# Patient Record
Sex: FEMALE | Race: BLACK OR AFRICAN AMERICAN | Employment: OTHER | URBAN - METROPOLITAN AREA
[De-identification: names, ages, dates, MRNs, and addresses within clinical notes are randomized per-mention and may not be internally consistent; named-entity substitution may affect disease eponyms.]

---

## 2022-01-25 ENCOUNTER — HOSPITAL ENCOUNTER (OUTPATIENT)
Dept: LAB | Age: 67
Discharge: HOME | End: 2022-01-25
Payer: MEDICARE

## 2022-01-25 ENCOUNTER — TELEPHONE (OUTPATIENT)
Dept: FAMILY MEDICINE CLINIC | Age: 67
End: 2022-01-25

## 2022-01-25 ENCOUNTER — OFFICE VISIT (OUTPATIENT)
Dept: FAMILY MEDICINE CLINIC | Age: 67
End: 2022-01-25
Payer: MEDICARE

## 2022-01-25 VITALS
TEMPERATURE: 98.7 F | HEIGHT: 72 IN | WEIGHT: 293 LBS | SYSTOLIC BLOOD PRESSURE: 150 MMHG | HEART RATE: 61 BPM | DIASTOLIC BLOOD PRESSURE: 72 MMHG | OXYGEN SATURATION: 96 % | BODY MASS INDEX: 39.68 KG/M2 | RESPIRATION RATE: 17 BRPM

## 2022-01-25 DIAGNOSIS — Z13.0 SCREENING FOR ENDOCRINE, METABOLIC AND IMMUNITY DISORDER: ICD-10-CM

## 2022-01-25 DIAGNOSIS — Z13.228 SCREENING FOR ENDOCRINE, METABOLIC AND IMMUNITY DISORDER: ICD-10-CM

## 2022-01-25 DIAGNOSIS — Z13.31 DEPRESSION SCREENING: ICD-10-CM

## 2022-01-25 DIAGNOSIS — Z71.89 ACP (ADVANCE CARE PLANNING): ICD-10-CM

## 2022-01-25 DIAGNOSIS — Z13.220 ENCOUNTER FOR LIPID SCREENING FOR CARDIOVASCULAR DISEASE: ICD-10-CM

## 2022-01-25 DIAGNOSIS — Z00.00 MEDICARE ANNUAL WELLNESS VISIT, INITIAL: ICD-10-CM

## 2022-01-25 DIAGNOSIS — E11.21 TYPE 2 DIABETES MELLITUS WITH DIABETIC NEPHROPATHY, WITHOUT LONG-TERM CURRENT USE OF INSULIN (HCC): ICD-10-CM

## 2022-01-25 DIAGNOSIS — I10 PRIMARY HYPERTENSION: ICD-10-CM

## 2022-01-25 DIAGNOSIS — Z13.6 ENCOUNTER FOR LIPID SCREENING FOR CARDIOVASCULAR DISEASE: ICD-10-CM

## 2022-01-25 DIAGNOSIS — N18.6 ESRD (END STAGE RENAL DISEASE) (HCC): ICD-10-CM

## 2022-01-25 DIAGNOSIS — Z13.29 SCREENING FOR ENDOCRINE, METABOLIC AND IMMUNITY DISORDER: ICD-10-CM

## 2022-01-25 DIAGNOSIS — Z13.820 ENCOUNTER FOR OSTEOPOROSIS SCREENING IN ASYMPTOMATIC POSTMENOPAUSAL PATIENT: ICD-10-CM

## 2022-01-25 DIAGNOSIS — Z78.0 ENCOUNTER FOR OSTEOPOROSIS SCREENING IN ASYMPTOMATIC POSTMENOPAUSAL PATIENT: ICD-10-CM

## 2022-01-25 DIAGNOSIS — R06.09 EXERTIONAL DYSPNEA: ICD-10-CM

## 2022-01-25 DIAGNOSIS — Z12.31 ENCOUNTER FOR SCREENING MAMMOGRAM FOR MALIGNANT NEOPLASM OF BREAST: ICD-10-CM

## 2022-01-25 LAB
ALBUMIN SERPL-MCNC: 3.7 G/DL (ref 3.4–5)
ALBUMIN/GLOB SERPL: 1 {RATIO} (ref 0.8–1.7)
ALP SERPL-CCNC: 144 U/L (ref 45–117)
ALT SERPL-CCNC: 28 U/L (ref 13–56)
ANION GAP SERPL CALC-SCNC: 4 MMOL/L (ref 3–18)
AST SERPL-CCNC: 11 U/L (ref 10–38)
BASOPHILS # BLD: 0.1 K/UL (ref 0–0.1)
BASOPHILS NFR BLD: 1 % (ref 0–2)
BILIRUB SERPL-MCNC: 0.4 MG/DL (ref 0.2–1)
BUN SERPL-MCNC: 53 MG/DL (ref 7–18)
BUN/CREAT SERPL: 14 (ref 12–20)
CALCIUM SERPL-MCNC: 9.7 MG/DL (ref 8.5–10.1)
CHLORIDE SERPL-SCNC: 113 MMOL/L (ref 100–111)
CHOLEST SERPL-MCNC: 64 MG/DL
CO2 SERPL-SCNC: 26 MMOL/L (ref 21–32)
CREAT SERPL-MCNC: 3.73 MG/DL (ref 0.6–1.3)
DIFFERENTIAL METHOD BLD: ABNORMAL
EOSINOPHIL # BLD: 0.4 K/UL (ref 0–0.4)
EOSINOPHIL NFR BLD: 7 % (ref 0–5)
ERYTHROCYTE [DISTWIDTH] IN BLOOD BY AUTOMATED COUNT: 15.9 % (ref 11.6–14.5)
EST. AVERAGE GLUCOSE BLD GHB EST-MCNC: 137 MG/DL
GLOBULIN SER CALC-MCNC: 3.7 G/DL (ref 2–4)
GLUCOSE SERPL-MCNC: 37 MG/DL (ref 74–99)
HBA1C MFR BLD: 6.4 % (ref 4.2–5.6)
HCT VFR BLD AUTO: 33.3 % (ref 35–45)
HDLC SERPL-MCNC: 55 MG/DL (ref 40–60)
HDLC SERPL: 1.2 {RATIO} (ref 0–5)
HGB BLD-MCNC: 9.4 G/DL (ref 12–16)
IMM GRANULOCYTES # BLD AUTO: 0.1 K/UL (ref 0–0.04)
IMM GRANULOCYTES NFR BLD AUTO: 1 % (ref 0–0.5)
LDLC SERPL CALC-MCNC: NORMAL MG/DL (ref 0–100)
LIPID PROFILE,FLP: NORMAL
LYMPHOCYTES # BLD: 1.8 K/UL (ref 0.9–3.6)
LYMPHOCYTES NFR BLD: 29 % (ref 21–52)
MCH RBC QN AUTO: 25.5 PG (ref 24–34)
MCHC RBC AUTO-ENTMCNC: 28.2 G/DL (ref 31–37)
MCV RBC AUTO: 90.2 FL (ref 78–100)
MONOCYTES # BLD: 0.5 K/UL (ref 0.05–1.2)
MONOCYTES NFR BLD: 8 % (ref 3–10)
NEUTS SEG # BLD: 3.2 K/UL (ref 1.8–8)
NEUTS SEG NFR BLD: 54 % (ref 40–73)
NRBC # BLD: 0 K/UL (ref 0–0.01)
NRBC BLD-RTO: 0 PER 100 WBC
PLATELET # BLD AUTO: 298 K/UL (ref 135–420)
PLATELET COMMENTS,PCOM: ABNORMAL
PMV BLD AUTO: 10.9 FL (ref 9.2–11.8)
POTASSIUM SERPL-SCNC: 5.3 MMOL/L (ref 3.5–5.5)
PROT SERPL-MCNC: 7.4 G/DL (ref 6.4–8.2)
RBC # BLD AUTO: 3.69 M/UL (ref 4.2–5.3)
RBC MORPH BLD: ABNORMAL
SODIUM SERPL-SCNC: 143 MMOL/L (ref 136–145)
TRIGL SERPL-MCNC: 49 MG/DL (ref ?–150)
TSH SERPL DL<=0.05 MIU/L-ACNC: 3.84 UIU/ML (ref 0.36–3.74)
VLDLC SERPL CALC-MCNC: NORMAL MG/DL
WBC # BLD AUTO: 6.1 K/UL (ref 4.6–13.2)

## 2022-01-25 PROCEDURE — 85025 COMPLETE CBC W/AUTO DIFF WBC: CPT

## 2022-01-25 PROCEDURE — 83036 HEMOGLOBIN GLYCOSYLATED A1C: CPT

## 2022-01-25 PROCEDURE — 80061 LIPID PANEL: CPT

## 2022-01-25 PROCEDURE — 99204 OFFICE O/P NEW MOD 45 MIN: CPT | Performed by: FAMILY MEDICINE

## 2022-01-25 PROCEDURE — 36415 COLL VENOUS BLD VENIPUNCTURE: CPT

## 2022-01-25 PROCEDURE — 80053 COMPREHEN METABOLIC PANEL: CPT

## 2022-01-25 PROCEDURE — 84443 ASSAY THYROID STIM HORMONE: CPT

## 2022-01-25 PROCEDURE — G0438 PPPS, INITIAL VISIT: HCPCS | Performed by: FAMILY MEDICINE

## 2022-01-25 RX ORDER — ASPIRIN 81 MG/1
TABLET ORAL DAILY
COMMUNITY

## 2022-01-25 RX ORDER — CARVEDILOL 6.25 MG/1
6.25 TABLET ORAL 2 TIMES DAILY WITH MEALS
COMMUNITY
End: 2022-03-01 | Stop reason: SDUPTHER

## 2022-01-25 RX ORDER — FOLIC ACID 1 MG/1
5 TABLET ORAL DAILY
COMMUNITY
End: 2022-03-01 | Stop reason: SDUPTHER

## 2022-01-25 RX ORDER — SIMVASTATIN 40 MG/1
40 TABLET, FILM COATED ORAL
COMMUNITY
End: 2022-03-01 | Stop reason: SDUPTHER

## 2022-01-25 RX ORDER — AMLODIPINE BESYLATE 10 MG/1
10 TABLET ORAL DAILY
COMMUNITY
End: 2022-03-01 | Stop reason: SDUPTHER

## 2022-01-25 RX ORDER — FOLIC ACID 1 MG/1
TABLET ORAL DAILY
COMMUNITY
End: 2022-02-01

## 2022-01-25 RX ORDER — CALCIUM CARBONATE 200(500)MG
2 TABLET,CHEWABLE ORAL 2 TIMES DAILY
Qty: 120 TABLET | Refills: 0 | Status: SHIPPED | OUTPATIENT
Start: 2022-01-25 | End: 2022-02-24

## 2022-01-25 RX ORDER — SEVELAMER CARBONATE 800 MG/1
800 TABLET, FILM COATED ORAL 3 TIMES DAILY
COMMUNITY
End: 2022-03-01 | Stop reason: SDUPTHER

## 2022-01-25 RX ORDER — FUROSEMIDE 40 MG/1
40 TABLET ORAL DAILY
COMMUNITY
End: 2022-02-01

## 2022-01-25 RX ORDER — FUROSEMIDE 40 MG/1
40 TABLET ORAL DAILY
COMMUNITY
End: 2022-03-01

## 2022-01-25 RX ORDER — METHYLDOPA 250 MG/1
250 TABLET, FILM COATED ORAL 3 TIMES DAILY
COMMUNITY
End: 2022-03-01 | Stop reason: SDUPTHER

## 2022-01-25 RX ORDER — HYDRALAZINE HYDROCHLORIDE 25 MG/1
25 TABLET, FILM COATED ORAL 3 TIMES DAILY
COMMUNITY
End: 2022-03-08

## 2022-01-25 RX ORDER — OMEPRAZOLE 40 MG/1
40 CAPSULE, DELAYED RELEASE ORAL DAILY
COMMUNITY
End: 2022-03-01 | Stop reason: SDUPTHER

## 2022-01-25 RX ORDER — CALCITRIOL 0.25 UG/1
0.25 CAPSULE ORAL DAILY
COMMUNITY
End: 2022-03-01 | Stop reason: SDUPTHER

## 2022-01-25 RX ORDER — INSULIN LISPRO 100 [IU]/ML
INJECTION, SOLUTION INTRAVENOUS; SUBCUTANEOUS
Qty: 1 EACH | Refills: 2
Start: 2022-01-25 | End: 2022-03-01 | Stop reason: SDUPTHER

## 2022-01-25 RX ORDER — CALCIUM CARBONATE 200(500)MG
1 TABLET,CHEWABLE ORAL 2 TIMES DAILY
COMMUNITY
End: 2022-01-25 | Stop reason: SDUPTHER

## 2022-01-25 NOTE — PATIENT INSTRUCTIONS
Medicare Wellness Visit, Female     The best way to live healthy is to have a lifestyle where you eat a well-balanced diet, exercise regularly, limit alcohol use, and quit all forms of tobacco/nicotine, if applicable. Regular preventive services are another way to keep healthy. Preventive services (vaccines, screening tests, monitoring & exams) can help personalize your care plan, which helps you manage your own care. Screening tests can find health problems at the earliest stages, when they are easiest to treat. [facility] follows the current, evidence-based guidelines published by the [facility] ([facility]) when recommending preventive services for our patients. Because we follow these guidelines, sometimes recommendations change over time as research supports it. (For example, mammograms used to be recommended annually. Even though Medicare will still pay for an annual mammogram, the newer guidelines recommend a mammogram every two years for women of average risk). Of course, you and your doctor may decide to screen more often for some diseases, based on your risk and your co-morbidities (chronic disease you are already diagnosed with). Preventive services for you include:  - Medicare offers their members a free annual wellness visit, which is time for you and your primary care provider to discuss and plan for your preventive service needs. Take advantage of this benefit every year!  -All adults over the age of 72 should receive the recommended pneumonia vaccines. Current USPSTF guidelines recommend a series of two vaccines for the best pneumonia protection.   -All adults should have a flu vaccine yearly and a tetanus vaccine every 10 years.   -All adults age 48 and older should receive the shingles vaccines (series of two vaccines).       -All adults age 38-68 who are overweight should have a diabetes screening test once every three years.   -All adults born between [birth year] and 1964 should be screened once for Hepatitis C.  -Other screening tests and preventive services for persons with diabetes include: an eye exam to screen for diabetic retinopathy, a kidney function test, a foot exam, and stricter control over your cholesterol.   -Cardiovascular screening for adults with routine risk involves an electrocardiogram (ECG) at intervals determined by your doctor.   -Colorectal cancer screenings should be done for adults age 54-65 with no increased risk factors for colorectal cancer. There are a number of acceptable methods of screening for this type of cancer. Each test has its own benefits and drawbacks. Discuss with your doctor what is most appropriate for you during your annual wellness visit. The different tests include: colonoscopy (considered the best screening method), a fecal occult blood test, a fecal DNA test, and sigmoidoscopy.    -A bone mass density test is recommended when a woman turns 65 to screen for osteoporosis. This test is only recommended one time, as a screening. Some providers will use this same test as a disease monitoring tool if you already have osteoporosis. -Breast cancer screenings are recommended every other year for women of normal risk, age 54-69.  -Cervical cancer screenings for women over age 72 are only recommended with certain risk factors.      Here is a list of your current Health Maintenance items (your personalized list of preventive services) with a due date:  Health Maintenance Due   Topic Date Due    Hepatitis C Test  Never done    COVID-19 Vaccine (1) Never done    DTaP/Tdap/Td  (1 - Tdap) Never done    Cervical cancer screen  Never done    Cholesterol Test   Never done    Colorectal Screening  Never done    Shingles Vaccine (1 of 2) Never done    Mammogram  Never done    Bone Mineral Density   Never done    Pneumococcal Vaccine (1 of 1 - PPSV23) Never done    Yearly Flu Vaccine (1) Never done Learning About Fluid Overload  What is fluid overload? Fluid overload means that your body has too much water. The extra fluid in your body can raise your blood pressure and force your heart to work harder. It can also make it hard for you to breathe. Most of your body is made up of water. The body uses minerals like sodium and potassium to help organs such as your heart, kidneys, and liver balance how much water you need. For example, the heart pumps blood to move water around the body. And the kidneys work to get rid of the water that the body doesn't need. Health conditions like kidney disease, heart failure, and cirrhosis can cause fluid overload. Other things can cause extra fluid to build up. IV fluids, some medicines, too much salt (sodium) from food, and certain medical treatments can sometimes cause this fluid increase. What are the symptoms? Some of the most common symptoms are:  · Gaining weight over a short period of time. · Swelling in the ankles or legs. · Shortness of breath. How is it treated? The goal of treatment is to remove the extra fluid in your body. Your treatment will depend on the cause. Your doctor may:  · Give you medicines, such as diuretics (also called \"water pills\"). They help your body get rid of the extra fluid. · Recommend that you limit sodium. Follow-up care is a key part of your treatment and safety. Be sure to make and go to all appointments, and call your doctor if you are having problems. It's also a good idea to know your test results and keep a list of the medicines you take. Where can you learn more? Go to http://www.gray.com/  Enter O110 in the search box to learn more about \"Learning About Fluid Overload. \"  Current as of: April 29, 2021               Content Version: 13.0  © 3991-1964 Healthwise, [employer].    Care instructions adapted under license by [employer] (which disclaims liability or warranty for this information). If you have questions about a medical condition or this instruction, always ask your healthcare professional. [place] [address] any warranty or liability for your use of this information.

## 2022-01-25 NOTE — ACP (ADVANCE CARE PLANNING)
Advance Care Planning     Advance Care Planning (ACP) Physician/NP/PA Conversation      Date of Conversation: 1/25/2022  Conducted with: Patient with Decision Making Capacity    Healthcare Decision Maker:   No healthcare decision makers have been documented. Click here to complete [place] including selection of the Healthcare Decision Maker Relationship (ie \"Primary\")      Today we documented Decision Maker(s) consistent with Legal Next of Kin hierarchy. Care Preferences:    Hospitalization: \"If your health worsens and it becomes clear that your chance of recovery is unlikely, what would be your preference regarding hospitalization? \"  The patient would prefer hospitalization. Ventilation: \"If you were unable to breathe on your own and your chance of recovery was unlikely, what would be your preference about the use of a ventilator (breathing machine) if it was available to you? \"   The patient would desire the use of a ventilator. Resuscitation: \"In the event your heart stopped as a result of an underlying serious health condition, would you want attempts to be made to restart your heart, or would you prefer a natural death? \"   Yes, attempt to resuscitate.     Additional topics discussed: treatment goals    Conversation Outcomes / Follow-Up Plan:   ACP complete - no further action today  Reviewed DNR/DNI and patient elects Full Code (Attempt Resuscitation)     Length of Voluntary ACP Conversation in minutes:  16 minutes    Lizzie Cameron MD

## 2022-01-25 NOTE — PROGRESS NOTES
Chief Complaint   Patient presents with   [facility] Annual Wellness Visit     1. \"Have you been to the ER, urgent care clinic since your last visit? Hospitalized since your last visit? \" Patient was hospitalized end of December at a hospital in [facility]    2. \"Have you seen or consulted any other health care providers outside of the [address] [place] [address] since your last visit? \" No     3. For patients aged 39-70: Has the patient had a colonoscopy / FIT/ Cologuard? No      If the patient is female:    4. For patients aged 41-77: Has the patient had a mammogram within the past 2 years? Yes - no Care Gap present  See top three    5. For patients aged 21-65: Has the patient had a pap smear? NA - based on age or sex      This is a \"Welcome to [employer]"  Initial Preventive Physical Examination (IPPE) providing Personalized Prevention Plan Services (Performed in the first 12 months of enrollment)    I have reviewed the patient's medical history in detail and updated the computerized patient record. Assessment/Plan   Education and counseling provided:  Are appropriate based on today's review and evaluation    1. Medicare annual wellness visit, initial  #RHM  -Immunizations: Declined at this time  -colon cancer screening: Declined at this time as patient is worried about end-stage renal disease and exertional dyspnea  -Breast cancer screening: Mammogram ordered at this visit  -DEXA scan: Ordered at this visit  -Routine labs: Ordered  -Eye Exam: Advised to see eye doctor yearly  -counseled about diet rich in green leafy vegetables and protein, less carbs /exercise at least 150 minutes a week/weight loss  -Advanced directive: Full code,  will be medical decision-maker in case patient cannot make her own decisions. 2. ACP (advance care planning); completed: Full code and  will be medical decision maker incase patient can not make decision. 3. Depression screening  4.  Encounter for screening mammogram for malignant neoplasm of breast  -     AUGUST MAMMO BI SCREENING INCL CAD; Future  5. Encounter for osteoporosis screening in asymptomatic postmenopausal patient  -     DEXA BONE DENSITY STUDY AXIAL; Future  6. Screening for endocrine, metabolic and immunity disorder  -     CBC WITH AUTOMATED DIFF; Future  -     METABOLIC PANEL, COMPREHENSIVE; Future  -     HEMOGLOBIN A1C WITH EAG; Future  -     TSH 3RD GENERATION; Future  7. Encounter for lipid screening for cardiovascular disease  -     LIPID PANEL; Future       Depression Risk Screen     3 most recent PHQ Screens 1/25/2022   Little interest or pleasure in doing things Not at all   Feeling down, depressed, irritable, or hopeless Not at all   Total Score PHQ 2 0       Alcohol & Drug Abuse Risk Screen    Do you average more than 1 drink per night or more than 7 drinks a week:  No    On any one occasion in the past three months have you have had more than 3 drinks containing alcohol:  No          Functional Ability and Level of Safety    Diet: The patient is prescribed and follows a special diet. Hearing: Hearing is good. Vision Screening:  Vision is fair. Visual Acuity Screening    Right eye Left eye Both eyes   Without correction:      With correction: 20/50 20/50 20/50         Activities of Daily Living: The home contains: no safety equipment. Patient does total self care      Ambulation: with no difficulty      Exercise level: moderately active     Fall Risk Screen:  Fall Risk Assessment, last 12 mths 1/25/2022   Able to walk? Yes   Fall in past 12 months? 0   Do you feel unsteady? 0   Are you worried about falling 0      Abuse Screen:  Patient is not abused       Screening EKG   EKG order placed: No    End of Life Planning   Advanced care planning directives were discussed with the patient and /or family/caregiver.      Health Maintenance Due     Health Maintenance Due   Topic Date Due    Hepatitis C Screening  Never done    COVID-19 Vaccine (1) Never done    DTaP/Tdap/Td series (1 - Tdap) Never done    Colorectal Cancer Screening Combo  Never done    Shingrix Vaccine Age 50> (1 of 2) Never done    Bone Densitometry (Dexa) Screening  Never done    Pneumococcal 65+ yrs at Risk Vaccine (1 of 2 - PCV13) Never done    Flu Vaccine (1) Never done    Medicare Yearly Exam  01/25/2022       Patient Care Team   Patient Care Team:  Narinder Witt MD as PCP - General (Family Medicine)    History     Past Medical History:   Diagnosis Date    Cancer ([facility]     Diabetes ([facility]     Hypertension     Kidney failure       Past Surgical History:   Procedure Laterality Date    HX BILATERAL MASTECTOMY      HX HYSTERECTOMY      HX TONSILLECTOMY       Current Outpatient Medications   Medication Sig Dispense Refill    folic acid (FOLVITE) 1 mg tablet Take 5 mg by mouth daily.  sevelamer carbonate (RENVELA) 800 mg tab tab Take 800 mg by mouth three (3) times daily.  simvastatin (ZOCOR) 40 mg tablet Take 40 mg by mouth nightly.  furosemide (LASIX) 40 mg tablet Take 40 mg by mouth daily.  calcitRIOL (ROCALTROL) 0.25 mcg capsule Take 0.25 mcg by mouth daily.  amLODIPine (NORVASC) 10 mg tablet Take 10 mg by mouth daily.  methyldopa (ALDOMET) 250 mg tablet Take 250 mg by mouth three (3) times daily.  hydrALAZINE (APRESOLINE) 25 mg tablet Take 25 mg by mouth three (3) times daily.  furosemide (Lasix) 40 mg tablet Take 40 mg by mouth daily.  carvediloL (COREG) 6.25 mg tablet Take 6.25 mg by mouth two (2) times daily (with meals). She Cuts Tablet into 4 and Takes a quarter      Ferrous Fumarate 325 mg (106 mg iron) tab Take  by mouth.  omeprazole (PRILOSEC) 40 mg capsule Take 40 mg by mouth daily.  aspirin delayed-release 81 mg tablet Take  by mouth daily.  folic acid (FOLVITE) 1 mg tablet Take  by mouth daily.       calcium carbonate (TUMS) 200 mg calcium (500 mg) chew Take 2 Tablets by mouth two (2) times a day for 30 days. 120 Tablet 0    insulin lispro (HUMALOG) 100 unit/mL injection Sliding scale 1 Each 2     Allergies   Allergen Reactions    Amoclan [Amoxicillin-Pot Clavulanate] Swelling       History reviewed. No pertinent family history.   Social History     Tobacco Use    Smoking status: Never Smoker    Smokeless tobacco: Never Used   Substance Use Topics    Alcohol use: Never       [employer]

## 2022-01-25 NOTE — PROGRESS NOTES
HPI  This is a 50-year-old -American female with past medical history significant for diabetes currently on insulin pump, hypertension, anemia secondary to CKD, end-stage renal disease not currently on dialysis who is here to establish care as well as for multiple complaints    Shortness of breath and weight gain  Patient states that for the past 2 weeks she has been feeling progressively more short of breath. She states that she has gained about 10 to 20 pounds in the last 4 weeks. Her legs are swollen. She has orthopnea. She states that she is unable to walk even a couple of feet without getting short of breath. Of note patient was recently admitted twice to the hospital in [facility] at the end of December and beginning of January for end-stage renal disease causing worsening of her current symptoms. Patient's last potassium level was 4.8, phosphorus was 3.4, PTH was greater than 1000, creatinine was 4, HbA1c 7.18. Of note patient also has a history of breast cancer in remission. ESRD  Patient states that she is admitted to the hospital in [facility] at least twice in the last month. She states that she needs a nephrologist. Her last GFR documented was 10. Last creatinine documented was three. She has had issues with phosphorus being too high, potassium being too high while in the hospital. She also has anemia likely due to CKD. For which she is taking ferrous sulfate tablets. Currently on folic acid tablets, calcium carbonate tablets, sevelamer carbonate 800 mg. HTN  Patient's blood pressure is not always well controlled at home. She states that blood pressure sometimes can reach anywhere between 140-170. Denies any headaches, blurred vision, chest pain, palpitation, claudication. She does attest to shortness of breath, orthopnea, swelling in legs.  Amlodipine 10 mg daily, Coreg 1.6 mg BID (due to low heart rate this is all she can tolerate), hydralazine 25 mg three times a day, Lasix 40 mg twice daily, methyldopa 250 mg three times a day. DM, insulin dependant   Patient is on insulin pump and would like referral to endocrinology. Her last A1c was 7.6. We will recheck HbA1c. Anemia secondary to ESRD vs Iron def  Patient is on ferrous sulfate twice daily along with vitamin D. History of breast cancer s/p Chemo/radiation  Patient has history of breast cancer we will order mammogram. She states that she has been cured and is not currently on any medication for this. This may have caused issues with her heart which may have led to shortness of breath and orthopnea. We will refer to cardiology. Past Medical History  Past Medical History:   Diagnosis Date    Cancer ([facility] [address]     Diabetes ([facility] [address]     Hypertension     Kidney failure        Surgical History  Past Surgical History:   Procedure Laterality Date    HX BILATERAL MASTECTOMY      HX HYSTERECTOMY      HX TONSILLECTOMY          Medications  Current Outpatient Medications   Medication Sig Dispense Refill    folic acid (FOLVITE) 1 mg tablet Take 5 mg by mouth daily.  sevelamer carbonate (RENVELA) 800 mg tab tab Take 800 mg by mouth three (3) times daily.  simvastatin (ZOCOR) 40 mg tablet Take 40 mg by mouth nightly.  furosemide (LASIX) 40 mg tablet Take 40 mg by mouth daily.  calcitRIOL (ROCALTROL) 0.25 mcg capsule Take 0.25 mcg by mouth daily.  amLODIPine (NORVASC) 10 mg tablet Take 10 mg by mouth daily.  methyldopa (ALDOMET) 250 mg tablet Take 250 mg by mouth three (3) times daily.  hydrALAZINE (APRESOLINE) 25 mg tablet Take 25 mg by mouth three (3) times daily.  furosemide (Lasix) 40 mg tablet Take 40 mg by mouth daily.  carvediloL (COREG) 6.25 mg tablet Take 6.25 mg by mouth two (2) times daily (with meals). She Cuts Tablet into 4 and Takes a quarter      Ferrous Fumarate 325 mg (106 mg iron) tab Take  by mouth.  omeprazole (PRILOSEC) 40 mg capsule Take 40 mg by mouth daily.       aspirin delayed-release 81 mg tablet Take  by mouth daily.  folic acid (FOLVITE) 1 mg tablet Take  by mouth daily.  calcium carbonate (TUMS) 200 mg calcium (500 mg) chew Take 2 Tablets by mouth two (2) times a day for 30 days. 120 Tablet 0    insulin lispro (HUMALOG) 100 unit/mL injection Sliding scale 1 Each 2       Allergies  Allergies   Allergen Reactions    Amoclan [Amoxicillin-Pot Clavulanate] Swelling       Family History  History reviewed. No pertinent family history. Social History  Social History     Socioeconomic History    Marital status:      Spouse name: Not on file    Number of children: Not on file    Years of education: Not on file    Highest education level: Not on file   Occupational History    Not on file   Tobacco Use    Smoking status: Never Smoker    Smokeless tobacco: Never Used   Vaping Use    Vaping Use: Never used   Substance and Sexual Activity    Alcohol use: Never    Drug use: Never    Sexual activity: Yes     Partners: Male     Birth control/protection: None   Other Topics Concern    Not on file   Social History Narrative    Not on file     Social Determinants of Health     Financial Resource Strain:     Difficulty of Paying Living Expenses: Not on file   Food Insecurity:     Worried About Running Out of Food in the Last Year: Not on file    Carol of Food in the Last Year: Not on file   Transportation Needs:     Lack of Transportation (Medical): Not on file    Lack of Transportation (Non-Medical):  Not on file   Physical Activity:     Days of Exercise per Week: Not on file    Minutes of Exercise per Session: Not on file   Stress:     Feeling of Stress : Not on file   Social Connections:     Frequency of Communication with Friends and Family: Not on file    Frequency of Social Gatherings with Friends and Family: Not on file    Attends [employer] Services: Not on file    Active Member of Clubs or Organizations: Not on file    Attends Club or Organization Meetings: Not on file    Marital Status: Not on file   Intimate Partner Violence:     Fear of Current or Ex-Partner: Not on file    Emotionally Abused: Not on file    Physically Abused: Not on file    Sexually Abused: Not on file   Housing Stability:     Unable to Pay for Housing in the Last Year: Not on file    Number of [place] in the Last Year: Not on file    Unstable Housing in the Last Year: Not on file       Review of Systems  Review of Systems   Constitutional: Negative for chills and fever. Weight gain. Patient states that while she was admitted to the hospital in [facility] at the end of December she weighed about 298 pounds and is currently 20 pounds heavier   HENT: Negative for congestion. Respiratory: Positive for shortness of breath and wheezing. Negative for cough. Cardiovascular: Positive for orthopnea and leg swelling. Negative for chest pain. Gastrointestinal: Negative for abdominal pain, nausea and vomiting. Skin: Negative for rash. Neurological: Negative for dizziness and headaches. Vital Signs  Visit Vitals  BP (!) 150/72   Pulse 61   Temp 98.7 °F (37.1 °C) (Temporal)   Resp 17   Ht 6' 1\" (1.854 m)   Wt 319 lb (144.7 kg)   SpO2 96%   BMI 42.09 kg/m²         Physical Exam  Physical Exam  Vitals reviewed. Constitutional:       Appearance: Normal appearance. She is obese. She is not toxic-appearing. HENT:      Head: Normocephalic and atraumatic. Right Ear: External ear normal.      Left Ear: External ear normal.      Nose: Nose normal.      Mouth/Throat:      Mouth: Mucous membranes are moist.   Eyes:      Extraocular Movements: Extraocular movements intact. Conjunctiva/sclera: Conjunctivae normal.   Cardiovascular:      Rate and Rhythm: Normal rate and regular rhythm. Pulses: Normal pulses. Heart sounds: Normal heart sounds. No murmur heard. No gallop. Pulmonary:      Effort: Respiratory distress present.       Breath sounds: Normal breath sounds. No wheezing. Comments: Crackles in bilateral lower lung fields. Patient is saturating 96% on room air at rest and 96% on ambulation. Respiratory rate of 17. Abdominal:      General: Bowel sounds are normal. There is distension. Palpations: Abdomen is soft. Tenderness: There is no abdominal tenderness. Musculoskeletal:         General: Normal range of motion. Cervical back: Normal range of motion. Right lower leg: Edema present. Left lower leg: Edema present. Comments: 2+ pitting edema bilateral lower extremities all the way up to lower abdomen. Swelling in bilateral hands present. Skin:     General: Skin is warm. Neurological:      General: No focal deficit present. Mental Status: She is alert and oriented to person, place, and time. Cranial Nerves: No cranial nerve deficit. Motor: No weakness. Gait: Gait normal.   Psychiatric:         Mood and Affect: Mood normal.         Behavior: Behavior normal.                Results  No results found for this or any previous visit. ASSESSMENT and PLAN    1. ESRD (end stage renal disease) ([facility] [place] [address]  -Last GFR was 10, creatinine was three  - REFERRAL TO NEPHROLOGY  - calcium carbonate (TUMS) 200 mg calcium (500 mg) chew; Take 2 Tablets by mouth two (2) times a day for 30 days. Dispense: 120 Tablet; Refill: 0  -Continue ferrous sulfate, Sevelamer, folic acid    2. Exertional dyspnea  -Patient has a history of breast cancer in remission.  -Complaining of orthopnea, exertional dyspnea, bilateral lower lung field crackles present   -Rule out CHF/cardiomyopathy  - REFERRAL TO CARDIOLOGY    3. Primary hypertension  -Continue Coreg, amlodipine, Lasix, hydralazine  -Advised to keep a log of blood pressure and bring to next appointment  -Counseled about weight loss, end-stage renal disease contributing to uncontrolled hypertension, DASH diet and sodium restriction.     4. Type 2 diabetes mellitus with diabetic nephropathy, without long-term current use of insulin (HCC)  -Check HbA1c, CMP  - REFERRAL TO ENDOCRINOLOGY  - insulin lispro (HUMALOG) 100 unit/mL injection; Sliding scale  Dispense: 1 Each; Refill: 2               Follow-up and Dispositions    · Return in about 4 weeks (around 2/22/2022), or if symptoms worsen or fail to improve, for follow up with labs. I have discussed the diagnosis with the patient and the intended plan of care as seen in the above orders. The patient has received an after-visit summary and questions were answered concerning future plans. I have discussed medication, side effects, and warnings with the patient in detail. The patient verbalized understanding and is in agreement with the plan of care. The patient will contact the office with any additional concerns. I spent at least 45 minutes on this visit with this new patient. Yvonne Duran MD    PLEASE NOTE:   This document has been produced using voice recognition software.  Unrecognized errors in transcription may be present

## 2022-01-26 ENCOUNTER — TELEPHONE (OUTPATIENT)
Dept: FAMILY MEDICINE CLINIC | Age: 67
End: 2022-01-26

## 2022-01-26 NOTE — TELEPHONE ENCOUNTER
Pt stated wanted Dr. Marcus Hilton to know that an endocrinologist in [place] would be fine with her. Please advise.  Thank you!!!

## 2022-01-26 NOTE — TELEPHONE ENCOUNTER
Spoke With Pt. After verifying identity I let her know where her referral was sent to she verbalized understanding.

## 2022-01-26 NOTE — TELEPHONE ENCOUNTER
I spoke with patient regarding her test results. They showed that her blood sugar was two seven. Gave her a call to see how she was doing now. She states that blood sugars drop three times overnight. They were in the 46s. She drank some Sprite and it came back up she stated. Advised patient to drop her basal dose of insulin by 2 units every other day until she stopped having such low nighttime numbers. She has been referred to endocrinologist at the last visit. Patient's HbA1c was 6.4. May be indicative that she is taking too much insulin. Patient is on insulin pump with a basal dose of 68 units. Advised to drop to 66 units and then another 2 units in 2 days should she continue to have hypoglycemic episodes. Her ALP was also elevated  We will check free ALP level at the next visit as well as see what the DEXA scan shows. Of note she has a history of breast cancer. Rest of liver enzymes were within normal limits. Potassium was normal 5.3. Advised to avoid high potassium foods. GFR was fifteen and creatinine was 3.73. Patient states that this was improved from when she was discharged from the hospital in [facility]. Patient has been referred to nephrologist.    Hemoglobin level 9.3 patient is on ferrous sulfate tablets twice a day as well as erythropoietin injections twice a week. Patient has been referred to nephrology. Rest of the labs were within normal limits.

## 2022-01-26 NOTE — TELEPHONE ENCOUNTER
Received a call from the lab tonight regarding patients blood sugar of 37 obtained on her labs from earlier today. Patient  reports that she is feeling well without any particular symptoms. Patient was asked to check her blood sugar for a current reading and her current blood sugar is 179. Patient  is on an insulin pump. She is being referred to endocrinology for further management. She states that her basal rate of insulin was being adjusted downward with her previous endocrinologist because of hypoglycemic episodes. She is currently at a 68 basal rate of insulin at this time. She states that her blood sugars do have a tendency to still drop overnight. Patient advised to check  her blood sugar just before bedtime tonight. Patient was advised that should she have a blood sugar in the low 70s at this check, she should consider a small snack before she goes to bed in order to avoid hypoglycemia through the night. Examples of more complex carbohydrates reviewed with the patient, as well as the other macronutrients. Patient advised to contact PCP if she continues to have recurrent hypoglycemic episodes  prior to her visit with her new endocrinologist.    Patient voiced understanding. Will forward message to PCP.

## 2022-02-01 ENCOUNTER — APPOINTMENT (OUTPATIENT)
Dept: GENERAL RADIOLOGY | Age: 67
End: 2022-02-01
Attending: STUDENT IN AN ORGANIZED HEALTH CARE EDUCATION/TRAINING PROGRAM
Payer: MEDICARE

## 2022-02-01 ENCOUNTER — HOSPITAL ENCOUNTER (EMERGENCY)
Age: 67
Discharge: HOME | End: 2022-02-01
Attending: STUDENT IN AN ORGANIZED HEALTH CARE EDUCATION/TRAINING PROGRAM
Payer: MEDICARE

## 2022-02-01 VITALS
SYSTOLIC BLOOD PRESSURE: 168 MMHG | DIASTOLIC BLOOD PRESSURE: 67 MMHG | RESPIRATION RATE: 20 BRPM | WEIGHT: 293 LBS | OXYGEN SATURATION: 96 % | BODY MASS INDEX: 39.68 KG/M2 | TEMPERATURE: 97.9 F | HEART RATE: 65 BPM | HEIGHT: 72 IN

## 2022-02-01 DIAGNOSIS — R06.00 DYSPNEA, UNSPECIFIED TYPE: Primary | ICD-10-CM

## 2022-02-01 DIAGNOSIS — R60.9 EDEMA, UNSPECIFIED TYPE: ICD-10-CM

## 2022-02-01 LAB
ALBUMIN SERPL-MCNC: 3.6 G/DL (ref 3.4–5)
ALBUMIN/GLOB SERPL: 1 {RATIO} (ref 0.8–1.7)
ALP SERPL-CCNC: 135 U/L (ref 45–117)
ALT SERPL-CCNC: 26 U/L (ref 13–56)
ANION GAP SERPL CALC-SCNC: 5 MMOL/L (ref 3–18)
APPEARANCE UR: CLEAR
AST SERPL-CCNC: 12 U/L (ref 10–38)
ATRIAL RATE: 60 BPM
BACTERIA URNS QL MICRO: ABNORMAL /HPF
BASOPHILS # BLD: 0 K/UL (ref 0–0.1)
BASOPHILS NFR BLD: 0 % (ref 0–2)
BILIRUB SERPL-MCNC: 0.3 MG/DL (ref 0.2–1)
BILIRUB UR QL: NEGATIVE
BNP SERPL-MCNC: 297 PG/ML (ref 0–900)
BUN SERPL-MCNC: 51 MG/DL (ref 7–18)
BUN/CREAT SERPL: 14 (ref 12–20)
CALCIUM SERPL-MCNC: 9.1 MG/DL (ref 8.5–10.1)
CALCULATED P AXIS, ECG09: 64 DEGREES
CALCULATED R AXIS, ECG10: 10 DEGREES
CALCULATED T AXIS, ECG11: 62 DEGREES
CHLORIDE SERPL-SCNC: 107 MMOL/L (ref 100–111)
CO2 SERPL-SCNC: 26 MMOL/L (ref 21–32)
COLOR UR: YELLOW
CREAT SERPL-MCNC: 3.59 MG/DL (ref 0.6–1.3)
DIAGNOSIS, 93000: NORMAL
DIFFERENTIAL METHOD BLD: ABNORMAL
EOSINOPHIL # BLD: 0.5 K/UL (ref 0–0.4)
EOSINOPHIL NFR BLD: 7 % (ref 0–5)
EPITH CASTS URNS QL MICRO: ABNORMAL /LPF (ref 0–5)
ERYTHROCYTE [DISTWIDTH] IN BLOOD BY AUTOMATED COUNT: 15.9 % (ref 11.6–14.5)
GLOBULIN SER CALC-MCNC: 3.6 G/DL (ref 2–4)
GLUCOSE SERPL-MCNC: 145 MG/DL (ref 74–99)
GLUCOSE UR STRIP.AUTO-MCNC: NEGATIVE MG/DL
HCT VFR BLD AUTO: 33.3 % (ref 35–45)
HGB BLD-MCNC: 9.5 G/DL (ref 12–16)
HGB UR QL STRIP: NEGATIVE
IMM GRANULOCYTES # BLD AUTO: 0.1 K/UL (ref 0–0.04)
IMM GRANULOCYTES NFR BLD AUTO: 1 % (ref 0–0.5)
KETONES UR QL STRIP.AUTO: NEGATIVE MG/DL
LEUKOCYTE ESTERASE UR QL STRIP.AUTO: NEGATIVE
LYMPHOCYTES # BLD: 1.2 K/UL (ref 0.9–3.6)
LYMPHOCYTES NFR BLD: 19 % (ref 21–52)
MCH RBC QN AUTO: 25.1 PG (ref 24–34)
MCHC RBC AUTO-ENTMCNC: 28.5 G/DL (ref 31–37)
MCV RBC AUTO: 88.1 FL (ref 78–100)
MONOCYTES # BLD: 0.5 K/UL (ref 0.05–1.2)
MONOCYTES NFR BLD: 8 % (ref 3–10)
NEUTS SEG # BLD: 4 K/UL (ref 1.8–8)
NEUTS SEG NFR BLD: 65 % (ref 40–73)
NITRITE UR QL STRIP.AUTO: NEGATIVE
NRBC # BLD: 0 K/UL (ref 0–0.01)
NRBC BLD-RTO: 0 PER 100 WBC
P-R INTERVAL, ECG05: 288 MS
PH UR STRIP: 6 [PH] (ref 5–8)
PLATELET # BLD AUTO: 250 K/UL (ref 135–420)
PMV BLD AUTO: 9.9 FL (ref 9.2–11.8)
POTASSIUM SERPL-SCNC: 5.5 MMOL/L (ref 3.5–5.5)
PROT SERPL-MCNC: 7.2 G/DL (ref 6.4–8.2)
PROT UR STRIP-MCNC: 30 MG/DL
Q-T INTERVAL, ECG07: 424 MS
QRS DURATION, ECG06: 94 MS
QTC CALCULATION (BEZET), ECG08: 424 MS
RBC # BLD AUTO: 3.78 M/UL (ref 4.2–5.3)
RBC #/AREA URNS HPF: ABNORMAL /HPF (ref 0–5)
SODIUM SERPL-SCNC: 138 MMOL/L (ref 136–145)
SP GR UR REFRACTOMETRY: 1.01 (ref 1–1.03)
TROPONIN-HIGH SENSITIVITY: 12 NG/L (ref 0–54)
UROBILINOGEN UR QL STRIP.AUTO: 0.2 EU/DL (ref 0.2–1)
VENTRICULAR RATE, ECG03: 60 BPM
WBC # BLD AUTO: 6.3 K/UL (ref 4.6–13.2)
WBC URNS QL MICRO: ABNORMAL /HPF (ref 0–4)

## 2022-02-01 PROCEDURE — 99284 EMERGENCY DEPT VISIT MOD MDM: CPT

## 2022-02-01 PROCEDURE — 71045 X-RAY EXAM CHEST 1 VIEW: CPT

## 2022-02-01 PROCEDURE — 83880 ASSAY OF NATRIURETIC PEPTIDE: CPT

## 2022-02-01 PROCEDURE — 96374 THER/PROPH/DIAG INJ IV PUSH: CPT

## 2022-02-01 PROCEDURE — 84484 ASSAY OF TROPONIN QUANT: CPT

## 2022-02-01 PROCEDURE — 81001 URINALYSIS AUTO W/SCOPE: CPT

## 2022-02-01 PROCEDURE — 85025 COMPLETE CBC W/AUTO DIFF WBC: CPT

## 2022-02-01 PROCEDURE — 93005 ELECTROCARDIOGRAM TRACING: CPT

## 2022-02-01 PROCEDURE — 74011250636 HC RX REV CODE- 250/636: Performed by: STUDENT IN AN ORGANIZED HEALTH CARE EDUCATION/TRAINING PROGRAM

## 2022-02-01 PROCEDURE — 80053 COMPREHEN METABOLIC PANEL: CPT

## 2022-02-01 RX ORDER — FUROSEMIDE 10 MG/ML
80 INJECTION INTRAMUSCULAR; INTRAVENOUS ONCE
Status: COMPLETED | OUTPATIENT
Start: 2022-02-01 | End: 2022-02-01

## 2022-02-01 RX ADMIN — FUROSEMIDE 80 MG: 10 INJECTION, SOLUTION INTRAVENOUS at 15:36

## 2022-02-01 NOTE — ED NOTES
Vitals have been updated. Pt continues to use restroom. She ambulates in hallway per provider request and she reports less dyspnea with exertion. Provider updated on poc.

## 2022-02-01 NOTE — ED NOTES
Pt presents with c/o \"swelling all over\" and dyspnea with exertion. She reports \"even in the shower I get so exhausted, my  has to come help me\". Pt is a/o x 4 with nad noted. Pt placed on cardiac monitoring and vitals cycling.

## 2022-02-01 NOTE — ED PROVIDER NOTES
EMERGENCY DEPARTMENT HISTORY AND PHYSICAL EXAM    I have evaluated the patient at 1:12 PM      Date: 2/1/2022  Patient Name: Boo Chand    History of Presenting Illness     Chief Complaint   Patient presents with    Shortness of Breath    Hand Swelling    Leg Swelling         History Provided By: Patient  Location/Duration/Severity/Modifying factors   This is a 25-year-old female with history of breast cancer, diabetes, CKD, hypertension presenting to the emergency department for evaluation of shortness of breath and extremity swelling. Patient reports that this has been progressive worsening over the past 3 months. Has gotten to the point where she is unable to exert herself whatsoever before feeling very winded. She also reports that she has gained about 30 pounds in the past 2 months. Patient splits her time between here and [facility]. She was hospitalized in [facility] in November stating that she had an VICKY on her CKD as well as hyperkalemia. She was managed there and ultimately discharged. Patient states that her nephrologist is in [facility] and she has not established any nephrology outpatient here. She denies having any chest pain, cough, fevers or chills, abdominal pain, NVD. Patient denies being anuric          PCP: Nehal Dupont MD    Current Facility-Administered Medications   Medication Dose Route Frequency Provider Last Rate Last Admin    furosemide (LASIX) injection 80 mg  80 mg IntraVENous ONCE Frank Jewel, DO         Current Outpatient Medications   Medication Sig Dispense Refill    insulin pump (PATIENT SUPPLIED) misc by SubCUTAneous route continuous.  folic acid (FOLVITE) 1 mg tablet Take 5 mg by mouth daily.  sevelamer carbonate (RENVELA) 800 mg tab tab Take 800 mg by mouth three (3) times daily.  simvastatin (ZOCOR) 40 mg tablet Take 40 mg by mouth nightly.  furosemide (LASIX) 40 mg tablet Take 40 mg by mouth daily.       calcitRIOL (ROCALTROL) 0.25 mcg capsule Take 0.25 mcg by mouth daily.  amLODIPine (NORVASC) 10 mg tablet Take 10 mg by mouth daily.  methyldopa (ALDOMET) 250 mg tablet Take 250 mg by mouth three (3) times daily.  hydrALAZINE (APRESOLINE) 25 mg tablet Take 25 mg by mouth three (3) times daily.  carvediloL (COREG) 6.25 mg tablet Take 6.25 mg by mouth two (2) times daily (with meals). She Cuts Tablet into 4 and Takes a quarter      Ferrous Fumarate 325 mg (106 mg iron) tab Take  by mouth.  omeprazole (PRILOSEC) 40 mg capsule Take 40 mg by mouth daily.  aspirin delayed-release 81 mg tablet Take  by mouth daily.  calcium carbonate (TUMS) 200 mg calcium (500 mg) chew Take 2 Tablets by mouth two (2) times a day for 30 days. 120 Tablet 0    insulin lispro (HUMALOG) 100 unit/mL injection Sliding scale 1 Each 2       Past History     Past Medical History:  Past Medical History:   Diagnosis Date    Asthma     [facility]     Diabetes [facility]     Elevated serum creatinine     ESRD (end stage renal disease) [facility]     Hypertension     Kidney failure     Serum potassium elevated        Past Surgical History:  Past Surgical History:   Procedure Laterality Date    HX BILATERAL MASTECTOMY      HX HYSTERECTOMY      HX TONSILLECTOMY         Family History:  No family history on file. Social History:  Social History     Tobacco Use    Smoking status: Never Smoker    Smokeless tobacco: Never Used   Vaping Use    Vaping Use: Never used   Substance Use Topics    Alcohol use: Never    Drug use: Never       Allergies: Allergies   Allergen Reactions    Amoclan [Amoxicillin-Pot Clavulanate] Swelling    Amitriptyline Itching    Benadryl [Diphenhydramine Hcl] Itching    Kiwi Itching    Pineapple Itching and Angioedema         Review of Systems       Review of Systems   Constitutional: Negative for activity change, chills, diaphoresis, fatigue and fever. Respiratory: Positive for shortness of breath.  Negative for cough, chest tightness, wheezing and stridor. Cardiovascular: Positive for leg swelling. Negative for chest pain and palpitations. Gastrointestinal: Negative for abdominal distention, abdominal pain, constipation, diarrhea, nausea and vomiting. Genitourinary: Negative for difficulty urinating, dysuria and hematuria. Musculoskeletal: Negative for back pain, joint swelling and myalgias. Skin: Negative for rash and wound. Neurological: Positive for light-headedness. Negative for dizziness, tremors, speech difficulty, weakness, numbness and headaches. Psychiatric/Behavioral: Negative for agitation. The patient is not nervous/anxious. Physical Exam     Visit Vitals  BP (!) 155/60 (BP 1 Location: Left upper arm)   Pulse (!) 56   Temp 97.9 °F (36.6 °C)   Resp 28   Ht 6' 1\" (1.854 m)   Wt 145.1 kg (319 lb 12.8 oz)   SpO2 100%   BMI 42.19 kg/m²         Physical Exam  Constitutional:       General: She is not in acute distress. Appearance: She is not toxic-appearing. HENT:      Head: Normocephalic and atraumatic. Mouth/Throat:      Mouth: Mucous membranes are moist.   Eyes:      Extraocular Movements: Extraocular movements intact. Pupils: Pupils are equal, round, and reactive to light. Cardiovascular:      Rate and Rhythm: Normal rate and regular rhythm. Heart sounds: Normal heart sounds. No murmur heard. No friction rub. No gallop. Pulmonary:      Effort: Pulmonary effort is normal.      Breath sounds: Examination of the right-lower field reveals rales. Examination of the left-lower field reveals rales. Rales present. Chest:      Chest wall: No mass, deformity or tenderness. Abdominal:      General: There is no distension. Palpations: Abdomen is soft. There is no mass. Tenderness: There is no abdominal tenderness. There is no guarding. Hernia: No hernia is present. Musculoskeletal:         General: No swelling, tenderness or deformity.       Cervical back: Normal range of motion and neck supple. Right lower leg: Edema present. Left lower leg: Edema present. Skin:     General: Skin is warm and dry. Capillary Refill: Capillary refill takes less than 2 seconds. Findings: No rash. Neurological:      General: No focal deficit present. Mental Status: She is alert and oriented to person, place, and time. Psychiatric:         Mood and Affect: Mood normal.           Diagnostic Study Results     Labs -  Recent Results (from the past 12 hour(s))   EKG, 12 LEAD, INITIAL    Collection Time: 02/01/22 12:55 PM   Result Value Ref Range    Ventricular Rate 60 BPM    Atrial Rate 60 BPM    P-R Interval 288 ms    QRS Duration 94 ms    Q-T Interval 424 ms    QTC Calculation (Bezet) 424 ms    Calculated P Axis 64 degrees    Calculated R Axis 10 degrees    Calculated T Axis 62 degrees    Diagnosis       Sinus rhythm with 1st degree AV block  Otherwise normal ECG  No previous ECGs available         Radiologic Studies -   XR CHEST PORT   Final Result   Findings which may be related to mild CHF. Follow-up can be   obtained. Medical Decision Making   I am the first provider for this patient. I reviewed the vital signs, available nursing notes, past medical history, past surgical history, family history and social history. Vital Signs-Reviewed the patient's vital signs. EKG: Sinus rhythm with first-degree AV block. No ST elevation or depression. No peaked T waves or U waves. Records Reviewed: Nursing Notes (Time of Review: 1:12 PM)    ED Course: Progress Notes, Reevaluation, and Consults:         Provider Notes (Medical Decision Making):   MDM  Number of Diagnoses or Management Options  Dyspnea, unspecified type  Edema, unspecified type  Diagnosis management comments: 43-year-old female presenting to the emergency department for evaluation of shortness of breath and extremity swelling. She does appear fluid overloaded.   Not in respiratory distress. Saturating 100% on room air currently. She does have some rales heard on her lung auscultation and appreciable edema. EKG obtained demonstrates sinus rhythm with first-degree AV block. No ST elevations or depressions. Plan on screening lab work to assess renal function and electrolyte status. Will obtain cardiac enzymes including BNP. X-ray obtained shows mild CHF. Patient care will be signed out to Dr. Matthew Hernández to follow-up results and to facilitate disposition            Diagnosis     Clinical Impression:   1. Dyspnea, unspecified type    2. Edema, unspecified type        Disposition: TBD    Follow-up Information    None          Patient's Medications   Start Taking    No medications on file   Continue Taking    AMLODIPINE (NORVASC) 10 MG TABLET    Take 10 mg by mouth daily. ASPIRIN DELAYED-RELEASE 81 MG TABLET    Take  by mouth daily. CALCITRIOL (ROCALTROL) 0.25 MCG CAPSULE    Take 0.25 mcg by mouth daily. CALCIUM CARBONATE (TUMS) 200 MG CALCIUM (500 MG) CHEW    Take 2 Tablets by mouth two (2) times a day for 30 days. CARVEDILOL (COREG) 6.25 MG TABLET    Take 6.25 mg by mouth two (2) times daily (with meals). She Cuts Tablet into 4 and Takes a quarter    FERROUS FUMARATE 325 MG (106 MG IRON) TAB    Take  by mouth. FOLIC ACID (FOLVITE) 1 MG TABLET    Take 5 mg by mouth daily. FUROSEMIDE (LASIX) 40 MG TABLET    Take 40 mg by mouth daily. HYDRALAZINE (APRESOLINE) 25 MG TABLET    Take 25 mg by mouth three (3) times daily. INSULIN LISPRO (HUMALOG) 100 UNIT/ML INJECTION    Sliding scale    INSULIN PUMP (PATIENT SUPPLIED) MISC    by SubCUTAneous route continuous. METHYLDOPA (ALDOMET) 250 MG TABLET    Take 250 mg by mouth three (3) times daily. OMEPRAZOLE (PRILOSEC) 40 MG CAPSULE    Take 40 mg by mouth daily. SEVELAMER CARBONATE (RENVELA) 800 MG TAB TAB    Take 800 mg by mouth three (3) times daily.     SIMVASTATIN (ZOCOR) 40 MG TABLET    Take 40 mg by mouth nightly. These Medications have changed    No medications on file   Stop Taking    FOLIC ACID (FOLVITE) 1 MG TABLET    Take  by mouth daily. FUROSEMIDE (LASIX) 40 MG TABLET    Take 40 mg by mouth daily. Disclaimer: Sections of this note are dictated using utilizing voice recognition software. Minor typographical errors may be present. If questions arise, please do not hesitate to contact me or call our department.

## 2022-02-01 NOTE — ED PROVIDER NOTES
Patient 41-year-old female with a history of diabetes, hypertension, and CKD stage IV. Patient presents with primary complaint of increasing dyspnea exertion has been present for the last 1 to 2 months, of note patient was recently admitted at a hospital in [facility] with elevated creatinine and elevated potassium that required 2 admissions but ultimately resolved without dialysis. She reports symmetric swelling in bilateral upper/lower extremities is associated with her dyspnea on exertion. Patient denies any associated fever/chills, cough, chest pain, nausea/vomiting, or syncope. Past Medical History:   Diagnosis Date    Asthma     [facility] [address] [facility]     Diabetes ([facility] [address] [facility]     Elevated serum creatinine     [facility]     Hypertension     Kidney failure     Serum potassium elevated        Past Surgical History:   Procedure Laterality Date    HX BILATERAL MASTECTOMY      HX HYSTERECTOMY      HX TONSILLECTOMY           No family history on file. Social History     Socioeconomic History    Marital status:      Spouse name: Not on file    Number of children: Not on file    Years of education: Not on file    Highest education level: Not on file   Occupational History    Not on file   Tobacco Use    Smoking status: Never Smoker    Smokeless tobacco: Never Used   Vaping Use    Vaping Use: Never used   Substance and Sexual Activity    Alcohol use: Never    Drug use: Never    Sexual activity: Yes     Partners: Male     Birth control/protection: None   Other Topics Concern    Not on file   Social History Narrative    Not on file     Social Determinants of Health     Financial Resource Strain:     Difficulty of Paying Living Expenses: Not on file   Food Insecurity:     Worried About Running Out of Food in the Last Year: Not on file    Carol of Food in the Last Year: Not on file   Transportation Needs:     Lack of Transportation (Medical):  Not on file    Lack of Transportation (Non-Medical): Not on file   Physical Activity:     Days of Exercise per Week: Not on file    Minutes of Exercise per Session: Not on file   Stress:     Feeling of Stress : Not on file   Social Connections:     Frequency of Communication with Friends and Family: Not on file    Frequency of Social Gatherings with Friends and Family: Not on file    Attends Latter-day Services: Not on file    Active Member of [address] [place] [address] or Organizations: Not on file    Attends Club or Organization Meetings: Not on file    Marital Status: Not on file   Intimate Partner Violence:     Fear of Current or Ex-Partner: Not on file    Emotionally Abused: Not on file    Physically Abused: Not on file    Sexually Abused: Not on file   Housing Stability:     Unable to Pay for Housing in the Last Year: Not on file    Number of [place] in the Last Year: Not on file    Unstable Housing in the Last Year: Not on file         ALLERGIES: Amoclan [amoxicillin-pot clavulanate], Amitriptyline, Benadryl [diphenhydramine hcl], Kiwi, and Pineapple    Review of Systems   Constitutional: Negative for chills and fever. HENT: Negative for rhinorrhea and sore throat. Eyes: Negative for discharge and redness. Respiratory: Positive for shortness of breath. Negative for cough. Cardiovascular: Negative for chest pain and leg swelling. Gastrointestinal: Negative for abdominal pain, diarrhea, nausea and vomiting. Genitourinary: Negative for difficulty urinating and dysuria. Musculoskeletal: Negative for back pain and neck pain. Skin: Negative for rash and wound. Neurological: Negative for syncope, light-headedness and headaches.        Vitals:    02/01/22 1300 02/01/22 1820   BP: (!) 155/60 (!) 168/67   Pulse: (!) 56 65   Resp: 28 20   Temp: 97.9 °F (36.6 °C)    SpO2: 100% 96%   Weight: 145.1 kg (319 lb 12.8 oz)    Height: 6' 1\" (1.854 m)             Physical Exam  Constitutional:       General: She is not in acute distress. Appearance: She is not ill-appearing, toxic-appearing or diaphoretic. HENT:      Head: Normocephalic and atraumatic. Right Ear: External ear normal.      Left Ear: External ear normal.      Nose: No congestion or rhinorrhea. Mouth/Throat:      Mouth: Mucous membranes are moist.      Pharynx: No oropharyngeal exudate or posterior oropharyngeal erythema. Eyes:      General:         Right eye: No discharge. Left eye: No discharge. Pupils: Pupils are equal, round, and reactive to light. Neck:      Vascular: No carotid bruit. Cardiovascular:      Rate and Rhythm: Normal rate and regular rhythm. Heart sounds: No murmur heard. No friction rub. No gallop. Pulmonary:      Effort: Pulmonary effort is normal. No respiratory distress. Breath sounds: No stridor. No wheezing, rhonchi or rales. Abdominal:      General: Abdomen is flat. There is no distension. Tenderness: There is no right CVA tenderness, left CVA tenderness, guarding or rebound. Musculoskeletal:         General: No swelling, tenderness, deformity or signs of injury. Cervical back: No rigidity or tenderness. Right lower leg: Edema present. Left lower leg: Edema present. Lymphadenopathy:      Cervical: No cervical adenopathy. Skin:     General: Skin is warm. Capillary Refill: Capillary refill takes less than 2 seconds. Coloration: Skin is not jaundiced or pale. Findings: No bruising, erythema, lesion or rash. Comments: Demonstrates diffuse nonpitting edema in bilateral upper/lower extremities with no associated erythema or tenderness. Neurological:      General: No focal deficit present. Mental Status: She is alert and oriented to person, place, and time. Sensory: No sensory deficit. Motor: No weakness.    Psychiatric:         Mood and Affect: Mood normal.          [facility]  ED Course as of 02/01/22 1842 Tue Feb 01, 2022   2527 Potassium: 5.5 [JK] 1839 Patient's work-up is overall reassuring with no clinical or laboratory indication for emergent dialysis. Patient reports significant improvement in her dyspnea on exertion following diuresis in the emergency department. Will recommend patient continues to take Lasix at the increased dose she was prescribed recently and to contact her primary care doctor as well as a nephrologist as soon as possible. Patient discharged in good condition.  [JK]      ED Course User Index  [JK] Adis Carr MD       Procedures

## 2022-02-01 NOTE — ED NOTES
MD and primary nurse made aware that multiple attempts to acquire blood for ordered labs unsuccessful. Primary nurse made aware that patient has patent IV to left hand.

## 2022-02-01 NOTE — ED TRIAGE NOTES
Patient states that she was hospitalized in [facility] in November and December with shortness of breath, elevated potassium and elevated creatinine. She states worsening swelling to bilateral upper extremities and left lower leg. Presents with shortness of breath with exertion. States blood glucose stays low \"most of the time\".

## 2022-02-01 NOTE — ED NOTES
Multiple attempts have been made, including arterial stick with no success. Provider made aware that blood work is still needed. Updated pt on poc. Urine obtained. Hat placed in toilet in order to accurately measure urine output. Pt gait is steady and she denies dizziness.

## 2022-02-01 NOTE — DISCHARGE INSTRUCTIONS
Contact your primary care doctor soon as possible for follow-up over the next 3 to 7 days. I also included information on a local nephrologist that you may contact you soon as possible to also schedule appointment for further evaluation. Please continue to take the increased dose of diuretic as prescribed by your primary care doctor. If you develop any sudden change in your symptoms including sudden worsening of shortness of breath, chest pain, passing out, or any other sudden/severe change in your condition please return immediately to emergency department further evaluation and treatment.

## 2022-02-04 ENCOUNTER — OFFICE VISIT (OUTPATIENT)
Dept: FAMILY MEDICINE CLINIC | Age: 67
End: 2022-02-04
Payer: MEDICARE

## 2022-02-04 VITALS
HEART RATE: 68 BPM | DIASTOLIC BLOOD PRESSURE: 58 MMHG | WEIGHT: 293 LBS | BODY MASS INDEX: 39.68 KG/M2 | HEIGHT: 72 IN | RESPIRATION RATE: 20 BRPM | TEMPERATURE: 99.1 F | OXYGEN SATURATION: 100 % | SYSTOLIC BLOOD PRESSURE: 136 MMHG

## 2022-02-04 DIAGNOSIS — E11.21 TYPE 2 DIABETES MELLITUS WITH DIABETIC NEPHROPATHY, WITHOUT LONG-TERM CURRENT USE OF INSULIN (HCC): ICD-10-CM

## 2022-02-04 DIAGNOSIS — E66.01 OBESITY, CLASS III, BMI 40-49.9 (MORBID OBESITY) (HCC): ICD-10-CM

## 2022-02-04 DIAGNOSIS — N18.6 ESRD (END STAGE RENAL DISEASE) (HCC): ICD-10-CM

## 2022-02-04 PROCEDURE — 2022F DILAT RTA XM EVC RTNOPTHY: CPT | Performed by: FAMILY MEDICINE

## 2022-02-04 PROCEDURE — 1090F PRES/ABSN URINE INCON ASSESS: CPT | Performed by: FAMILY MEDICINE

## 2022-02-04 PROCEDURE — G8427 DOCREV CUR MEDS BY ELIG CLIN: HCPCS | Performed by: FAMILY MEDICINE

## 2022-02-04 PROCEDURE — G9231 DOC ESRD DIA TRANS PREG: HCPCS | Performed by: FAMILY MEDICINE

## 2022-02-04 PROCEDURE — G8536 NO DOC ELDER MAL SCRN: HCPCS | Performed by: FAMILY MEDICINE

## 2022-02-04 PROCEDURE — G8417 CALC BMI ABV UP PARAM F/U: HCPCS | Performed by: FAMILY MEDICINE

## 2022-02-04 PROCEDURE — 3044F HG A1C LEVEL LT 7.0%: CPT | Performed by: FAMILY MEDICINE

## 2022-02-04 PROCEDURE — 99213 OFFICE O/P EST LOW 20 MIN: CPT | Performed by: FAMILY MEDICINE

## 2022-02-04 PROCEDURE — 3017F COLORECTAL CA SCREEN DOC REV: CPT | Performed by: FAMILY MEDICINE

## 2022-02-04 PROCEDURE — G8400 PT W/DXA NO RESULTS DOC: HCPCS | Performed by: FAMILY MEDICINE

## 2022-02-04 PROCEDURE — G8510 SCR DEP NEG, NO PLAN REQD: HCPCS | Performed by: FAMILY MEDICINE

## 2022-02-04 PROCEDURE — 1101F PT FALLS ASSESS-DOCD LE1/YR: CPT | Performed by: FAMILY MEDICINE

## 2022-02-04 PROCEDURE — G9899 SCRN MAM PERF RSLTS DOC: HCPCS | Performed by: FAMILY MEDICINE

## 2022-02-04 NOTE — PATIENT INSTRUCTIONS

## 2022-02-04 NOTE — PROGRESS NOTES
HPI  This is a 72-year-old -American female with past medical history significant for diabetes currently on insulin pump, hypertension, anemia secondary to CKD, end-stage renal disease not currently on dialysis who is here to follow up on ED visit. ED follow up  02/01/2022 Patient seen in the ED for worsening shortness of breath and anasarca likely due to end-stage renal disease/CKD stage IV. Patient was given IV Lasix 80 mg once. CMP showed elevated potassium of 5.5. Patient was discharged with improved symptoms and with advice to follow-up with PCP and nephrologist soon as possible. CKD stage IV/ESRD  Latest CMP showed BUN of 51, creatinine of 3.59, GFR of 15 and K+ of 5.5. Patient was seen by nephrologist yesterday who told her that he would switch her diuretic to a stronger 1. She was advised to restrict water intake to 30 to 40 ounces a day. Also advised to adhere to a low sodium and low potassium diet. We will give her low sodium of potassium diet handout today and referred to dietitian as well. Insulin Dependant DM  Last HbA1c was 6.4. Patient has been struggling with hypoglycemia at nighttime. She is on an insulin pump she has been referred to endocrinology. She states that she checks her blood sugars at least 6 times a day because she gets very low at nighttime and has to check it more often than she wants. Patient would like the qualify for continuous glucose monitor. She states that she needs her glucose test strips which we will order today.         Past Medical History  Past Medical History:   Diagnosis Date    Asthma     [facility]     Diabetes ([facility]     Elevated serum creatinine     ESRD (end stage renal disease) ([facility]     Hypertension     Kidney failure     Serum potassium elevated        Surgical History  Past Surgical History:   Procedure Laterality Date    HX BILATERAL MASTECTOMY      HX HYSTERECTOMY      HX TONSILLECTOMY          Medications  Current Outpatient Medications   Medication Sig Dispense Refill    glucose blood VI test strips (ASCENSIA AUTODISC VI, ONE TOUCH ULTRA TEST VI) strip Check blood glucose 3 times a day 100 Strip 3    insulin pump (PATIENT SUPPLIED) [facility] by SubCUTAneous route continuous.  folic acid (FOLVITE) 1 mg tablet Take 5 mg by mouth daily.  sevelamer carbonate (RENVELA) 800 mg tab tab Take 800 mg by mouth three (3) times daily.  simvastatin (ZOCOR) 40 mg tablet Take 40 mg by mouth nightly.  furosemide (LASIX) 40 mg tablet Take 40 mg by mouth daily.  calcitRIOL (ROCALTROL) 0.25 mcg capsule Take 0.25 mcg by mouth daily.  amLODIPine (NORVASC) 10 mg tablet Take 10 mg by mouth daily.  methyldopa (ALDOMET) 250 mg tablet Take 250 mg by mouth three (3) times daily.  hydrALAZINE (APRESOLINE) 25 mg tablet Take 25 mg by mouth three (3) times daily.  carvediloL (COREG) 6.25 mg tablet Take 6.25 mg by mouth two (2) times daily (with meals). She Cuts Tablet into 4 and Takes a quarter      Ferrous Fumarate 325 mg (106 mg iron) tab Take  by mouth.  omeprazole (PRILOSEC) 40 mg capsule Take 40 mg by mouth daily.  aspirin delayed-release 81 mg tablet Take  by mouth daily.  calcium carbonate (TUMS) 200 mg calcium (500 mg) chew Take 2 Tablets by mouth two (2) times a day for 30 days. 120 Tablet 0    insulin lispro (HUMALOG) 100 unit/mL injection Sliding scale 1 Each 2       Allergies  Allergies   Allergen Reactions    Amoclan [Amoxicillin-Pot Clavulanate] Swelling    Amitriptyline Itching    Benadryl [Diphenhydramine Hcl] Itching    Kiwi Itching    Pineapple Itching and Angioedema       Family History  History reviewed. No pertinent family history.     Social History  Social History     Socioeconomic History    Marital status:      Spouse name: Not on file    Number of children: Not on file    Years of education: Not on file    Highest education level: Not on file   Occupational History    Not on file   Tobacco Use    Smoking status: Never Smoker    Smokeless tobacco: Never Used   Vaping Use    Vaping Use: Never used   Substance and Sexual Activity    Alcohol use: Never    Drug use: Never    Sexual activity: Yes     Partners: Male     Birth control/protection: None   Other Topics Concern    Not on file   Social History Narrative    Not on file     Social Determinants of Health     Financial Resource Strain:     Difficulty of Paying Living Expenses: Not on file   Food Insecurity:     Worried About Running Out of Food in the Last Year: Not on file    Carol of Food in the Last Year: Not on file   Transportation Needs:     Lack of Transportation (Medical): Not on file    Lack of Transportation (Non-Medical): Not on file   Physical Activity:     Days of Exercise per Week: Not on file    Minutes of Exercise per Session: Not on file   Stress:     Feeling of Stress : Not on file   Social Connections:     Frequency of Communication with Friends and Family: Not on file    Frequency of Social Gatherings with Friends and Family: Not on file    Attends [employer] Services: Not on file    Active Member of [address] [place] [address] or Organizations: Not on file    Attends Club or Organization Meetings: Not on file    Marital Status: Not on file   Intimate Partner Violence:     Fear of Current or Ex-Partner: Not on file    Emotionally Abused: Not on file    Physically Abused: Not on file    Sexually Abused: Not on file   Housing Stability:     Unable to Pay for Housing in the Last Year: Not on file    Number of [place] in the Last Year: Not on file    Unstable Housing in the Last Year: Not on file       Review of Systems  Review of Systems   Constitutional: Negative for chills and fever. Respiratory: Negative for cough and shortness of breath. Cardiovascular: Negative for chest pain and leg swelling. Gastrointestinal: Negative for abdominal pain, nausea and vomiting.    Skin: Negative for rash. Neurological: Negative for dizziness and headaches. Vital Signs  Visit Vitals  BP (!) 136/58 (BP 1 Location: Left upper arm, BP Patient Position: Sitting, BP Cuff Size: Large adult)   Pulse 68   Temp 99.1 °F (37.3 °C) (Temporal)   Resp 20   Ht 6' 1\" (1.854 m)   Wt 313 lb (142 kg)   SpO2 100%   BMI 41.30 kg/m²         Physical Exam  Physical Exam  Vitals reviewed. Constitutional:       Appearance: Normal appearance. HENT:      Head: Normocephalic and atraumatic. Right Ear: External ear normal.      Left Ear: External ear normal.      Nose: Nose normal.      Mouth/Throat:      Mouth: Mucous membranes are moist.   Eyes:      Extraocular Movements: Extraocular movements intact. Conjunctiva/sclera: Conjunctivae normal.      Pupils: Pupils are equal, round, and reactive to light. Cardiovascular:      Rate and Rhythm: Normal rate and regular rhythm. Pulses: Normal pulses. Heart sounds: Normal heart sounds. No murmur heard. No gallop. Pulmonary:      Effort: Pulmonary effort is normal. No respiratory distress. Breath sounds: Normal breath sounds. No rhonchi or rales. Abdominal:      General: Bowel sounds are normal. There is no distension. Palpations: Abdomen is soft. Tenderness: There is no abdominal tenderness. Musculoskeletal:         General: Normal range of motion. Cervical back: Normal range of motion. Right lower leg: Edema present. Left lower leg: Edema present. Comments: 1+ pitting edema up to the shins bilateral lower extremities   Skin:     General: Skin is warm. Neurological:      General: No focal deficit present. Mental Status: She is alert and oriented to person, place, and time. Cranial Nerves: No cranial nerve deficit. Motor: No weakness.       Gait: Gait normal.   Psychiatric:         Mood and Affect: Mood normal.         Behavior: Behavior normal.                Results  Results for orders placed or performed during the hospital encounter of 05/26/07   METABOLIC PANEL, COMPREHENSIVE   Result Value Ref Range    Sodium 138 136 - 145 mmol/L    Potassium 5.5 3.5 - 5.5 mmol/L    Chloride 107 100 - 111 mmol/L    CO2 26 21 - 32 mmol/L    Anion gap 5 3.0 - 18 mmol/L    Glucose 145 (H) 74 - 99 mg/dL    BUN 51 (H) 7.0 - 18 MG/DL    Creatinine 3.59 (H) 0.6 - 1.3 MG/DL    BUN/Creatinine ratio 14 12 - 20      GFR est AA 15 (L) >60 ml/min/1.73m2    GFR est non-AA 13 (L) >60 ml/min/1.73m2    Calcium 9.1 8.5 - 10.1 MG/DL    Bilirubin, total 0.3 0.2 - 1.0 MG/DL    ALT (SGPT) 26 13 - 56 U/L    AST (SGOT) 12 10 - 38 U/L    Alk. phosphatase 135 (H) 45 - 117 U/L    Protein, total 7.2 6.4 - 8.2 g/dL    Albumin 3.6 3.4 - 5.0 g/dL    Globulin 3.6 2.0 - 4.0 g/dL    A-G Ratio 1.0 0.8 - 1.7     TROPONIN-HIGH SENSITIVITY   Result Value Ref Range    Troponin-High Sensitivity 12 0 - 54 ng/L   NT-PRO BNP   Result Value Ref Range    NT pro- 0 - 900 PG/ML   CBC WITH AUTOMATED DIFF   Result Value Ref Range    WBC 6.3 4.6 - 13.2 K/uL    RBC 3.78 (L) 4.20 - 5.30 M/uL    HGB 9.5 (L) 12.0 - 16.0 g/dL    HCT 33.3 (L) 35.0 - 45.0 %    MCV 88.1 78.0 - 100.0 FL    MCH 25.1 24.0 - 34.0 PG    MCHC 28.5 (L) 31.0 - 37.0 g/dL    RDW 15.9 (H) 11.6 - 14.5 %    PLATELET 662 829 - 677 K/uL    MPV 9.9 9.2 - 11.8 FL    NRBC 0.0 0  WBC    ABSOLUTE NRBC 0.00 0.00 - 0.01 K/uL    NEUTROPHILS 65 40 - 73 %    LYMPHOCYTES 19 (L) 21 - 52 %    MONOCYTES 8 3 - 10 %    EOSINOPHILS 7 (H) 0 - 5 %    BASOPHILS 0 0 - 2 %    IMMATURE GRANULOCYTES 1 (H) 0.0 - 0.5 %    ABS. NEUTROPHILS 4.0 1.8 - 8.0 K/UL    ABS. LYMPHOCYTES 1.2 0.9 - 3.6 K/UL    ABS. MONOCYTES 0.5 0.05 - 1.2 K/UL    ABS. EOSINOPHILS 0.5 (H) 0.0 - 0.4 K/UL    ABS. BASOPHILS 0.0 0.0 - 0.1 K/UL    ABS. IMM.  GRANS. 0.1 (H) 0.00 - 0.04 K/UL    DF AUTOMATED     URINALYSIS W/ RFLX MICROSCOPIC   Result Value Ref Range    Color YELLOW      Appearance CLEAR      Specific gravity 1.006 1.005 - 1.030      pH (UA) 6.0 5.0 - 8.0      Protein 30 (A) NEG mg/dL    Glucose Negative NEG mg/dL    Ketone Negative NEG mg/dL    Bilirubin Negative NEG      Blood Negative NEG      Urobilinogen 0.2 0.2 - 1.0 EU/dL    Nitrites Negative NEG      Leukocyte Esterase Negative NEG     URINE MICROSCOPIC ONLY   Result Value Ref Range    WBC NONE 0 - 4 /hpf    RBC NONE 0 - 5 /hpf    Epithelial cells FEW 0 - 5 /lpf    Bacteria FEW (A) NEG /hpf   EKG, 12 LEAD, INITIAL   Result Value Ref Range    Ventricular Rate 60 BPM    Atrial Rate 60 BPM    P-R Interval 288 ms    QRS Duration 94 ms    Q-T Interval 424 ms    QTC Calculation (Bezet) 424 ms    Calculated P Axis 64 degrees    Calculated R Axis 10 degrees    Calculated T Axis 62 degrees    Diagnosis       Sinus rhythm with 1st degree AV block  Otherwise normal ECG  No previous ECGs available  Confirmed by Cj Love MD, [facility] ([id number]) on 2/1/2022 4:25:54 PM         ASSESSMENT and PLAN  1. Type 2 diabetes mellitus with diabetic nephropathy, without long-term current use of insulin ([facility]  Patient has been referred to endocrinology at the last visit   -Has insulin pump  -Will order continuous glucose monitor  - glucose blood VI test strips (ASCENSIA AUTODISC VI, ONE TOUCH ULTRA TEST VI) strip; Check blood glucose 3 times a day  Dispense: 100 Strip; Refill: 3    2. ESRD (end stage renal disease) ([facility]  - REFERRAL TO DIETITIAN  -continue 30-40 oz fluid restriction per nephrology  -Patient follows up with nephrologist  -Patient's diuretic was switched from Lasix to something else. Given handout about low sodium and low potassium diet    3. Obesity, Class III, BMI 40-49.9 (morbid obesity) ([facility]  -Counseled about diet, exercise, weight loss. Patient has been referred to dietitian           Follow-up and Dispositions    · Return in about 3 months (around 5/4/2022), or if symptoms worsen or fail to improve, for follow up DM, follow up HTN.             I have discussed the diagnosis with the patient and the intended plan of care as seen in the above orders. The patient has received an after-visit summary and questions were answered concerning future plans. I have discussed medication, side effects, and warnings with the patient in detail. The patient verbalized understanding and is in agreement with the plan of care. The patient will contact the office with any additional concerns. I spent at least 30 minutes on this visit with this established patient. Jenifer Brown MD    PLEASE NOTE:   This document has been produced using voice recognition software.  Unrecognized errors in transcription may be present

## 2022-02-04 NOTE — PROGRESS NOTES
Chief Complaint   Patient presents with    Follow-up     1. \"Have you been to the ER, urgent care clinic since your last visit? Hospitalized since your last visit? \" Yes When: 1/31 Where: [facility] Reason for visit: Couldn't Breath     2. \"Have you seen or consulted any other health care providers outside of the [address] [place] [address] since your last visit? \" No     3. For patients aged 39-70: Has the patient had a colonoscopy / FIT/ Cologuard? No      If the patient is female:    4. For patients aged 41-77: Has the patient had a mammogram within the past 2 years? No      5. For patients aged 21-65: Has the patient had a pap smear?  NA - based on age or sex

## 2022-02-09 ENCOUNTER — TRANSCRIBE ORDER (OUTPATIENT)
Dept: SCHEDULING | Age: 67
End: 2022-02-09

## 2022-02-09 DIAGNOSIS — N18.4 CKD (CHRONIC KIDNEY DISEASE), STAGE IV (HCC): Primary | ICD-10-CM

## 2022-02-16 ENCOUNTER — OFFICE VISIT (OUTPATIENT)
Dept: CARDIOLOGY CLINIC | Age: 67
End: 2022-02-16
Payer: MEDICARE

## 2022-02-16 VITALS
HEIGHT: 72 IN | WEIGHT: 293 LBS | SYSTOLIC BLOOD PRESSURE: 158 MMHG | BODY MASS INDEX: 39.68 KG/M2 | DIASTOLIC BLOOD PRESSURE: 80 MMHG | OXYGEN SATURATION: 98 % | HEART RATE: 63 BPM

## 2022-02-16 DIAGNOSIS — R06.09 EXERTIONAL DYSPNEA: Primary | ICD-10-CM

## 2022-02-16 DIAGNOSIS — R06.83 SNORING: ICD-10-CM

## 2022-02-16 DIAGNOSIS — R60.9 SWELLING: ICD-10-CM

## 2022-02-16 DIAGNOSIS — R06.02 SOB (SHORTNESS OF BREATH): ICD-10-CM

## 2022-02-16 PROCEDURE — G8427 DOCREV CUR MEDS BY ELIG CLIN: HCPCS | Performed by: INTERNAL MEDICINE

## 2022-02-16 PROCEDURE — G8536 NO DOC ELDER MAL SCRN: HCPCS | Performed by: INTERNAL MEDICINE

## 2022-02-16 PROCEDURE — 3017F COLORECTAL CA SCREEN DOC REV: CPT | Performed by: INTERNAL MEDICINE

## 2022-02-16 PROCEDURE — G8400 PT W/DXA NO RESULTS DOC: HCPCS | Performed by: INTERNAL MEDICINE

## 2022-02-16 PROCEDURE — G9899 SCRN MAM PERF RSLTS DOC: HCPCS | Performed by: INTERNAL MEDICINE

## 2022-02-16 PROCEDURE — G8510 SCR DEP NEG, NO PLAN REQD: HCPCS | Performed by: INTERNAL MEDICINE

## 2022-02-16 PROCEDURE — 93000 ELECTROCARDIOGRAM COMPLETE: CPT | Performed by: INTERNAL MEDICINE

## 2022-02-16 PROCEDURE — 99204 OFFICE O/P NEW MOD 45 MIN: CPT | Performed by: INTERNAL MEDICINE

## 2022-02-16 PROCEDURE — 1090F PRES/ABSN URINE INCON ASSESS: CPT | Performed by: INTERNAL MEDICINE

## 2022-02-16 PROCEDURE — 1101F PT FALLS ASSESS-DOCD LE1/YR: CPT | Performed by: INTERNAL MEDICINE

## 2022-02-16 PROCEDURE — G8417 CALC BMI ABV UP PARAM F/U: HCPCS | Performed by: INTERNAL MEDICINE

## 2022-02-16 PROCEDURE — G9231 DOC ESRD DIA TRANS PREG: HCPCS | Performed by: INTERNAL MEDICINE

## 2022-02-16 NOTE — PROGRESS NOTES
HISTORY OF PRESENT ILLNESS  Tanya Bradley is a 73 y.o. female. ASSESSMENT and PLAN    Ms. Isabelle Carr has no prior history of heart disease. Around 2019, she had episode of increased ENGEL. She is from [facility] and had evaluation at that time including an echocardiogram and was told that her heart was doing fine. From November 2021 until February 2022, she has had significantly worsening dyspnea on exertion, as well as severe swelling. Of note, since mid 2021, she has been sleeping in the chair because of shortness of breath when she lays down. She has history of diabetes mellitus, insulin pump, and diabetic kidney disease. She has history of right breast carcinoma status post mastectomy and reconstruction back in 1997. She did have chemotherapy. She had hysterectomy performed in 1999. She did have sleep study performed around 2008 in [place]. She was told that this was unremarkable. From cardiac standpoint, she has not had any chest pains at rest or with exertion. Her major issue appears to be increased dyspnea on exertion which sounds gradual over the last 18 months but mostly noticeable over the last 3 months. She has orthopnea and PND. She was started on diuretic regimen by her primary care doctor. She also has history of diabetes and diabetic kidney disease. I would defer aggressive diuresis to her nephrologist.  I would like to check BMP 20 and magnesium level. If for protein and albumin level low, her swelling likely is partially due to low intravascular oncotic pressure. Her weight today is 309 pounds. She states that from 4010 until 2021, she weighed 290 pounds. I would also like to check an echocardiogram to reassess overall left ventricular systolic function, valvular integrity, as well as pulmonary hypertension. She does have obesity. She states that she snores but never been told that she stops breathing. She would likely benefit from a sleep study.   I will see her back in 1 month after the testing for echocardiogram and BMP 20 has been done. Encounter Diagnoses   Name Primary?  Exertional dyspnea Yes    Swelling     SOB (shortness of breath)     Snoring      current treatment plan is effective, no change in therapy  lab results and schedule of future lab studies reviewed with patient  reviewed diet, exercise and weight control      HPI   Ms. Camilo Dixon comes in as a new patient today. She states that she has had progressively worsening shortness of breath, ENGEL, and swelling over the last 3 months. However, on further interview, it appears that she has been having gradually worsening shortness of breath, ENGEL for over 18 months. She also had a similar episode about 3 years ago which was evaluated in [facility]. She has orthopnea. She is unable to lay flat to go to sleep. She sleeps sitting up. She has been doing that since 2021. She denies any palpitations or dizziness. Review of Systems   Respiratory: Positive for shortness of breath. Cardiovascular: Positive for orthopnea, leg swelling and PND. Negative for chest pain, palpitations and claudication. All other systems reviewed and are negative. Physical Exam  Vitals and nursing note reviewed. Constitutional:       Appearance: She is obese. HENT:      Head: Normocephalic. Eyes:      Conjunctiva/sclera: Conjunctivae normal.   Neck:      Vascular: No carotid bruit. Cardiovascular:      Rate and Rhythm: Normal rate and regular rhythm. Pulmonary:      Breath sounds: Normal breath sounds. Abdominal:      Palpations: Abdomen is soft. Musculoskeletal:         General: Swelling present. Cervical back: No rigidity. Skin:     General: Skin is dry. Neurological:      General: No focal deficit present. Mental Status: She is alert and oriented to person, place, and time.    Psychiatric:         Mood and Affect: Mood normal.         Behavior: Behavior normal.         PCP: Terry Torres MD    Past Medical History:   Diagnosis Date    Asthma     [facility]     Diabetes ([facility]     Elevated serum creatinine     ESRD (end stage renal disease) ([facility]     Hypertension     Kidney failure     Serum potassium elevated        Past Surgical History:   Procedure Laterality Date    HX BILATERAL MASTECTOMY      HX HYSTERECTOMY      HX TONSILLECTOMY         Current Outpatient Medications   Medication Sig Dispense Refill    glucose blood VI test strips (ASCENSIA AUTODISC VI, ONE TOUCH ULTRA TEST VI) strip Check blood glucose 3 times a day 100 Strip 3    insulin pump (PATIENT SUPPLIED) misc by SubCUTAneous route continuous.  folic acid (FOLVITE) 1 mg tablet Take 5 mg by mouth daily.  sevelamer carbonate (RENVELA) 800 mg tab tab Take 800 mg by mouth three (3) times daily.  simvastatin (ZOCOR) 40 mg tablet Take 40 mg by mouth nightly.  calcitRIOL (ROCALTROL) 0.25 mcg capsule Take 0.25 mcg by mouth daily.  amLODIPine (NORVASC) 10 mg tablet Take 10 mg by mouth daily.  methyldopa (ALDOMET) 250 mg tablet Take 250 mg by mouth three (3) times daily.  hydrALAZINE (APRESOLINE) 25 mg tablet Take 25 mg by mouth three (3) times daily.  carvediloL (COREG) 6.25 mg tablet Take 6.25 mg by mouth two (2) times daily (with meals). She Cuts Tablet into 4 and Takes a quarter      Ferrous Fumarate 325 mg (106 mg iron) tab Take  by mouth.  omeprazole (PRILOSEC) 40 mg capsule Take 40 mg by mouth daily.  aspirin delayed-release 81 mg tablet Take  by mouth daily.  calcium carbonate (TUMS) 200 mg calcium (500 mg) chew Take 2 Tablets by mouth two (2) times a day for 30 days. 120 Tablet 0    insulin lispro (HUMALOG) 100 unit/mL injection Sliding scale 1 Each 2    furosemide (LASIX) 40 mg tablet Take 40 mg by mouth daily.  (Patient not taking: Reported on 2/16/2022)         The patient has a family history of    Social History     Tobacco Use    Smoking status: Never Smoker  Smokeless tobacco: Never Used   Vaping Use    Vaping Use: Never used   Substance Use Topics    Alcohol use: Never    Drug use: Never       Lab Results   Component Value Date/Time    Cholesterol, total 64 01/25/2022 12:41 PM    HDL Cholesterol 55 01/25/2022 12:41 PM    LDL, calculated Cannot be calculated 01/25/2022 12:41 PM    Triglyceride 49 01/25/2022 12:41 PM    CHOL/HDL Ratio 1.2 01/25/2022 12:41 PM        BP Readings from Last 3 Encounters:   02/16/22 (!) 158/80   02/04/22 (!) 136/58   02/01/22 (!) 168/67        Pulse Readings from Last 3 Encounters:   02/16/22 63   02/04/22 68   02/01/22 65       Wt Readings from Last 3 Encounters:   02/16/22 140.2 kg (309 lb)   02/04/22 142 kg (313 lb)   02/01/22 145.1 kg (319 lb 12.8 oz)         EKG: normal EKG, normal sinus rhythm, unchanged from previous tracings.

## 2022-02-16 NOTE — PROGRESS NOTES
Oksana Mariano presents today for   Chief Complaint   Patient presents with    New Patient     referred by PCP for exertional dyspnea       Oksana Mariano preferred language for health care discussion is english/other. Is someone accompanying this pt? no    Is the patient using any DME equipment during [address]? no    Depression Screening:  3 most recent PHQ Screens 2/16/2022   Little interest or pleasure in doing things Not at all   Feeling down, depressed, irritable, or hopeless Not at all   Total Score PHQ 2 0       Learning Assessment:  Learning Assessment 2/16/2022   PRIMARY LEARNER Patient   PRIMARY LANGUAGE ENGLISH   LEARNER PREFERENCE PRIMARY DEMONSTRATION   ANSWERED BY patient   RELATIONSHIP SELF       Abuse Screening:  Abuse Screening Questionnaire 2/16/2022   Do you ever feel afraid of your partner? N   Are you in a relationship with someone who physically or mentally threatens you? N   Is it safe for you to go home? Y       Fall Risk  Fall Risk Assessment, last 12 mths 2/16/2022   Able to walk? Yes   Fall in past 12 months? 0   Do you feel unsteady? 0   Are you worried about falling 0           Pt currently taking Anticoagulant therapy? no    Pt currently taking Antiplatelet therapy ? Aspirin 81 mg      Coordination of Care:  1. Have you been to the ER, urgent care clinic since your last visit? Hospitalized since your last visit? no    2. Have you seen or consulted any other health care providers outside of the [address] [place] [address] since your last visit? Include any pap smears or colon screening.  no

## 2022-02-21 ENCOUNTER — HOSPITAL ENCOUNTER (OUTPATIENT)
Dept: ULTRASOUND IMAGING | Age: 67
Discharge: HOME | End: 2022-02-21
Attending: INTERNAL MEDICINE
Payer: MEDICARE

## 2022-02-21 DIAGNOSIS — N18.4 CKD (CHRONIC KIDNEY DISEASE), STAGE IV (HCC): ICD-10-CM

## 2022-02-21 PROCEDURE — 76770 US EXAM ABDO BACK WALL COMP: CPT

## 2022-02-22 ENCOUNTER — HOSPITAL ENCOUNTER (OUTPATIENT)
Dept: BONE DENSITY | Age: 67
Discharge: HOME | End: 2022-02-22
Attending: FAMILY MEDICINE
Payer: MEDICARE

## 2022-02-22 ENCOUNTER — HOSPITAL ENCOUNTER (OUTPATIENT)
Dept: LAB | Age: 67
Discharge: HOME | End: 2022-02-22
Payer: MEDICARE

## 2022-02-22 ENCOUNTER — HOSPITAL ENCOUNTER (OUTPATIENT)
Dept: MAMMOGRAPHY | Age: 67
Discharge: HOME | End: 2022-02-22
Attending: FAMILY MEDICINE
Payer: MEDICARE

## 2022-02-22 DIAGNOSIS — Z12.31 ENCOUNTER FOR SCREENING MAMMOGRAM FOR MALIGNANT NEOPLASM OF BREAST: ICD-10-CM

## 2022-02-22 DIAGNOSIS — Z13.820 ENCOUNTER FOR OSTEOPOROSIS SCREENING IN ASYMPTOMATIC POSTMENOPAUSAL PATIENT: ICD-10-CM

## 2022-02-22 DIAGNOSIS — Z78.0 ENCOUNTER FOR OSTEOPOROSIS SCREENING IN ASYMPTOMATIC POSTMENOPAUSAL PATIENT: ICD-10-CM

## 2022-02-22 LAB
ALBUMIN SERPL-MCNC: 3.7 G/DL (ref 3.4–5)
ANION GAP SERPL CALC-SCNC: 3 MMOL/L (ref 3–18)
BUN SERPL-MCNC: 51 MG/DL (ref 7–18)
BUN/CREAT SERPL: 14 (ref 12–20)
CALCIUM SERPL-MCNC: 9.4 MG/DL (ref 8.5–10.1)
CALCIUM SERPL-MCNC: 9.7 MG/DL (ref 8.5–10.1)
CHLORIDE SERPL-SCNC: 111 MMOL/L (ref 100–111)
CO2 SERPL-SCNC: 26 MMOL/L (ref 21–32)
CREAT SERPL-MCNC: 3.76 MG/DL (ref 0.6–1.3)
CREAT UR-MCNC: 63 MG/DL (ref 30–125)
ERYTHROCYTE [DISTWIDTH] IN BLOOD BY AUTOMATED COUNT: 16.4 % (ref 11.6–14.5)
FERRITIN SERPL-MCNC: 35 NG/ML (ref 8–388)
GLUCOSE SERPL-MCNC: 59 MG/DL (ref 74–99)
HCT VFR BLD AUTO: 34.9 % (ref 35–45)
HGB BLD-MCNC: 9.7 G/DL (ref 12–16)
IRON SATN MFR SERPL: 13 % (ref 20–50)
IRON SERPL-MCNC: 46 UG/DL (ref 50–175)
MCH RBC QN AUTO: 24.9 PG (ref 24–34)
MCHC RBC AUTO-ENTMCNC: 27.8 G/DL (ref 31–37)
MCV RBC AUTO: 89.7 FL (ref 78–100)
NRBC # BLD: 0 K/UL (ref 0–0.01)
NRBC BLD-RTO: 0 PER 100 WBC
PHOSPHATE SERPL-MCNC: 3.9 MG/DL (ref 2.5–4.9)
PLATELET # BLD AUTO: 273 K/UL (ref 135–420)
PMV BLD AUTO: 10.5 FL (ref 9.2–11.8)
POTASSIUM SERPL-SCNC: 5.9 MMOL/L (ref 3.5–5.5)
PROT UR-MCNC: 72 MG/DL
PTH-INTACT SERPL-MCNC: 799 PG/ML (ref 18.4–88)
RBC # BLD AUTO: 3.89 M/UL (ref 4.2–5.3)
SODIUM SERPL-SCNC: 140 MMOL/L (ref 136–145)
TIBC SERPL-MCNC: 345 UG/DL (ref 250–450)
WBC # BLD AUTO: 5.6 K/UL (ref 4.6–13.2)

## 2022-02-22 PROCEDURE — 77080 DXA BONE DENSITY AXIAL: CPT

## 2022-02-22 PROCEDURE — 83521 IG LIGHT CHAINS FREE EACH: CPT

## 2022-02-22 PROCEDURE — 84165 PROTEIN E-PHORESIS SERUM: CPT

## 2022-02-22 PROCEDURE — 36415 COLL VENOUS BLD VENIPUNCTURE: CPT

## 2022-02-22 PROCEDURE — 83970 ASSAY OF PARATHORMONE: CPT

## 2022-02-22 PROCEDURE — 82728 ASSAY OF FERRITIN: CPT

## 2022-02-22 PROCEDURE — 77067 SCR MAMMO BI INCL CAD: CPT

## 2022-02-22 PROCEDURE — 83540 ASSAY OF IRON: CPT

## 2022-02-22 PROCEDURE — 80069 RENAL FUNCTION PANEL: CPT

## 2022-02-22 PROCEDURE — 84156 ASSAY OF PROTEIN URINE: CPT

## 2022-02-22 PROCEDURE — 82570 ASSAY OF URINE CREATININE: CPT

## 2022-02-22 PROCEDURE — 85027 COMPLETE CBC AUTOMATED: CPT

## 2022-02-23 LAB
ALBUMIN SERPL ELPH-MCNC: 3.6 G/DL (ref 2.9–4.4)
ALBUMIN/GLOB SERPL: 1 {RATIO} (ref 0.7–1.7)
ALPHA1 GLOB SERPL ELPH-MCNC: 0.2 G/DL (ref 0–0.4)
ALPHA2 GLOB SERPL ELPH-MCNC: 0.6 G/DL (ref 0.4–1)
B-GLOBULIN SERPL ELPH-MCNC: 1.1 G/DL (ref 0.7–1.3)
GAMMA GLOB SERPL ELPH-MCNC: 1.8 G/DL (ref 0.4–1.8)
GLOBULIN SER CALC-MCNC: 3.7 G/DL (ref 2.2–3.9)
KAPPA LC FREE SER-MCNC: 149.5 MG/L (ref 3.3–19.4)
KAPPA LC FREE/LAMBDA FREE SER: 1.61 {RATIO} (ref 0.26–1.65)
LAMBDA LC FREE SERPL-MCNC: 92.6 MG/L (ref 5.7–26.3)
M PROTEIN SERPL ELPH-MCNC: NORMAL G/DL
PROT SERPL-MCNC: 7.3 G/DL (ref 6–8.5)

## 2022-03-01 ENCOUNTER — OFFICE VISIT (OUTPATIENT)
Dept: FAMILY MEDICINE CLINIC | Age: 67
End: 2022-03-01
Payer: MEDICARE

## 2022-03-01 VITALS
OXYGEN SATURATION: 96 % | BODY MASS INDEX: 39.68 KG/M2 | HEIGHT: 72 IN | WEIGHT: 293 LBS | HEART RATE: 58 BPM | SYSTOLIC BLOOD PRESSURE: 145 MMHG | DIASTOLIC BLOOD PRESSURE: 68 MMHG | TEMPERATURE: 99.3 F

## 2022-03-01 DIAGNOSIS — E11.21 TYPE 2 DIABETES MELLITUS WITH DIABETIC NEPHROPATHY, WITHOUT LONG-TERM CURRENT USE OF INSULIN (HCC): ICD-10-CM

## 2022-03-01 DIAGNOSIS — L23.9 ALLERGIC CONTACT DERMATITIS, UNSPECIFIED TRIGGER: ICD-10-CM

## 2022-03-01 DIAGNOSIS — E53.8 FOLATE DEFICIENCY: ICD-10-CM

## 2022-03-01 DIAGNOSIS — I10 PRIMARY HYPERTENSION: ICD-10-CM

## 2022-03-01 DIAGNOSIS — E16.2 HYPOGLYCEMIA: ICD-10-CM

## 2022-03-01 DIAGNOSIS — E78.2 MIXED HYPERLIPIDEMIA: ICD-10-CM

## 2022-03-01 DIAGNOSIS — K21.9 GASTROESOPHAGEAL REFLUX DISEASE, UNSPECIFIED WHETHER ESOPHAGITIS PRESENT: ICD-10-CM

## 2022-03-01 DIAGNOSIS — N18.6 ESRD (END STAGE RENAL DISEASE) (HCC): ICD-10-CM

## 2022-03-01 PROBLEM — E11.9 TYPE 2 DIABETES MELLITUS (HCC): Status: ACTIVE | Noted: 2022-03-01

## 2022-03-01 LAB — GLUCOSE POC: 55 MG/DL

## 2022-03-01 PROCEDURE — G8510 SCR DEP NEG, NO PLAN REQD: HCPCS | Performed by: FAMILY MEDICINE

## 2022-03-01 PROCEDURE — 82962 GLUCOSE BLOOD TEST: CPT | Performed by: FAMILY MEDICINE

## 2022-03-01 PROCEDURE — 2022F DILAT RTA XM EVC RTNOPTHY: CPT | Performed by: FAMILY MEDICINE

## 2022-03-01 PROCEDURE — G8417 CALC BMI ABV UP PARAM F/U: HCPCS | Performed by: FAMILY MEDICINE

## 2022-03-01 PROCEDURE — G8536 NO DOC ELDER MAL SCRN: HCPCS | Performed by: FAMILY MEDICINE

## 2022-03-01 PROCEDURE — G9231 DOC ESRD DIA TRANS PREG: HCPCS | Performed by: FAMILY MEDICINE

## 2022-03-01 PROCEDURE — 1090F PRES/ABSN URINE INCON ASSESS: CPT | Performed by: FAMILY MEDICINE

## 2022-03-01 PROCEDURE — 99214 OFFICE O/P EST MOD 30 MIN: CPT | Performed by: FAMILY MEDICINE

## 2022-03-01 PROCEDURE — G8399 PT W/DXA RESULTS DOCUMENT: HCPCS | Performed by: FAMILY MEDICINE

## 2022-03-01 PROCEDURE — G9899 SCRN MAM PERF RSLTS DOC: HCPCS | Performed by: FAMILY MEDICINE

## 2022-03-01 PROCEDURE — 1101F PT FALLS ASSESS-DOCD LE1/YR: CPT | Performed by: FAMILY MEDICINE

## 2022-03-01 PROCEDURE — 3044F HG A1C LEVEL LT 7.0%: CPT | Performed by: FAMILY MEDICINE

## 2022-03-01 PROCEDURE — 3017F COLORECTAL CA SCREEN DOC REV: CPT | Performed by: FAMILY MEDICINE

## 2022-03-01 PROCEDURE — G8427 DOCREV CUR MEDS BY ELIG CLIN: HCPCS | Performed by: FAMILY MEDICINE

## 2022-03-01 RX ORDER — TRIAMCINOLONE ACETONIDE 1 MG/G
OINTMENT TOPICAL 2 TIMES DAILY
Qty: 270 G | Refills: 1 | Status: SHIPPED | OUTPATIENT
Start: 2022-03-01 | End: 2022-03-15

## 2022-03-01 RX ORDER — MAGNESIUM SULFATE 100 %
15 CRYSTALS MISCELLANEOUS AS NEEDED
Qty: 90 TABLET | Refills: 2 | Status: SHIPPED | OUTPATIENT
Start: 2022-03-01 | End: 2022-05-30

## 2022-03-01 RX ORDER — PREDNISONE 20 MG/1
20 TABLET ORAL
Qty: 5 TABLET | Refills: 0 | Status: SHIPPED | OUTPATIENT
Start: 2022-03-01 | End: 2022-03-06

## 2022-03-01 RX ORDER — OMEPRAZOLE 40 MG/1
40 CAPSULE, DELAYED RELEASE ORAL DAILY
Qty: 90 CAPSULE | Refills: 3 | Status: SHIPPED | OUTPATIENT
Start: 2022-03-01 | End: 2022-03-25 | Stop reason: SDUPTHER

## 2022-03-01 RX ORDER — AMLODIPINE BESYLATE 10 MG/1
10 TABLET ORAL DAILY
Qty: 30 TABLET | Refills: 5 | Status: SHIPPED | OUTPATIENT
Start: 2022-03-01 | End: 2022-03-08

## 2022-03-01 RX ORDER — SIMVASTATIN 40 MG/1
40 TABLET, FILM COATED ORAL
Qty: 90 TABLET | Refills: 3 | Status: SHIPPED | OUTPATIENT
Start: 2022-03-01 | End: 2022-03-25 | Stop reason: SDUPTHER

## 2022-03-01 RX ORDER — INSULIN LISPRO 100 [IU]/ML
INJECTION, SOLUTION INTRAVENOUS; SUBCUTANEOUS
Qty: 1 EACH | Refills: 2
Start: 2022-03-01 | End: 2022-03-17 | Stop reason: SDUPTHER

## 2022-03-01 RX ORDER — METHYLDOPA 250 MG/1
250 TABLET, FILM COATED ORAL 3 TIMES DAILY
Qty: 270 TABLET | Refills: 0 | Status: SHIPPED | OUTPATIENT
Start: 2022-03-01 | End: 2022-03-04

## 2022-03-01 RX ORDER — FOLIC ACID 1 MG/1
5 TABLET ORAL DAILY
Qty: 150 TABLET | Refills: 1 | Status: SHIPPED | OUTPATIENT
Start: 2022-03-01 | End: 2022-04-25 | Stop reason: SDUPTHER

## 2022-03-01 RX ORDER — HYDROXYZINE HYDROCHLORIDE 10 MG/1
10 TABLET, FILM COATED ORAL
Qty: 30 TABLET | Refills: 0 | Status: SHIPPED | OUTPATIENT
Start: 2022-03-01 | End: 2022-03-11

## 2022-03-01 RX ORDER — CALCITRIOL 0.25 UG/1
0.25 CAPSULE ORAL DAILY
Qty: 90 CAPSULE | Refills: 2 | Status: SHIPPED | OUTPATIENT
Start: 2022-03-01 | End: 2022-03-25 | Stop reason: SDUPTHER

## 2022-03-01 RX ORDER — CARVEDILOL 6.25 MG/1
6.25 TABLET ORAL 2 TIMES DAILY WITH MEALS
Qty: 180 TABLET | Refills: 3 | Status: SHIPPED | OUTPATIENT
Start: 2022-03-01 | End: 2022-03-25 | Stop reason: SDUPTHER

## 2022-03-01 RX ORDER — SEVELAMER CARBONATE 800 MG/1
800 TABLET, FILM COATED ORAL 3 TIMES DAILY
Qty: 90 TABLET | Refills: 1 | Status: SHIPPED | OUTPATIENT
Start: 2022-03-01 | End: 2022-03-25 | Stop reason: SDUPTHER

## 2022-03-01 NOTE — PATIENT INSTRUCTIONS

## 2022-03-03 DIAGNOSIS — R93.9 DIAGNOSTIC IMAGING INCONCLUSIVE DUE TO EXCESS BODY FAT OF PATIENT: ICD-10-CM

## 2022-03-03 DIAGNOSIS — N63.20 LEFT BREAST MASS: ICD-10-CM

## 2022-03-03 NOTE — PROGRESS NOTES
Would you mind telling this patient that there is a little bit of an abnormality in the left breast that requires additional imaging. I will place an order for further imaging for better evaluation of the abnormality seen the mammogram. Please let me know if she has any questions. Thank you.

## 2022-03-03 NOTE — PROGRESS NOTES
HPI  This is a 71-year-old -American female with past medical history significant for diabetes currently on insulin pump, hypertension, anemia secondary to CKD, end-stage renal disease not currently on dialysis who is here for a new onset rash and refill on her medication     Generalized Rash   Patient states that rash started initially on the ankles and then spread upwards to involve legs thighs abdomen chest.  Rash is dark and discolored with white scaly patches. Patient states that it is very itchy and disturbs her sleep. States that rash started about a week ago. Does not know if she was allergic to anything. Has not changed her detergent, soap recently. Her  also has a similar rash. Denies any other constitutional symptoms. This is happened once before when she had contact dermatitis. Of note she has tried zyrtec with no relief of symptoms. She has tried over the counter hydrocortisone cream which helped some. I will prescribe prednisone 20 mg x 5 days. Counseled patient that this may elevate her blood sugars. Advised that she start using triamcinolone cream on affected areas twice a day for 14 days after prednisone use. Will prescribe hydroxyzine 10 mg 3 times daily for itching which is renal dosing. Advised patient not to stay on this medication for too long. End-stage renal disease  Patient is going to follow-up with her nephrologist and will ask about erythropoietin injections which she has been on due to her chronic kidney disease. I will refill her ferrous sulfate, folate, calcitriol, sevelamer. Insulin dependant type 2 DM  Patient is currently on insulin pump and is having frequent hypoglycemia episodes possible due to CKD making it so insulin stays in body longer. Advised patient to decrease insulin by 2 units every 3 days until hypoglycemia episodes have stopped. Patient will follow up with endocrinologist soon.  She needs refill on her humalog and she will be prescribed glucose tablets    Hypoglycemia  Patient had an episode of hypoglycemia while in the office. She states that she started having some abdominal cramping as well as lightheadedness and knew that her blood sugar has dropped. Unchecking blood sugar was 55. Patient was given 2 glucose tablets and stated symptoms resolved. I will prescribe glucose tablets for patient to have on hand as she states she has had multiple episodes of hypoglycemia. Advised patient to follow-up with endocrinologist as well as decrease her insulin dose by 2 units every 3 days until hypoglycemia has stopped. Likely due to chronic kidney disease which is hindering insulin excretion. Primary hypertension  Patient's blood pressure is within normal limits. She denies any headaches, blurred vision, chest pain, shortness of breath, palpitation, swelling in legs, claudication. She states that blood pressures well controlled at home. She would like a refill on her amlodipine 10 mg daily, methyldopa 250 mg 3 times daily, Coreg 6.25 mg twice daily. Folate deficiency  Patient currently takes 5 mg of folate daily. And would like refill. She needs to check folate levels. HLD  Needs simvastatin refill. Past Medical History  Past Medical History:   Diagnosis Date    Asthma     [facility]     Diabetes ([facility]     Elevated serum creatinine     ESRD (end stage renal disease) ([facility]     Hypertension     Kidney failure     Serum potassium elevated        Surgical History  Past Surgical History:   Procedure Laterality Date    HX BILATERAL MASTECTOMY      HX HYSTERECTOMY      HX TONSILLECTOMY          Medications  Current Outpatient Medications   Medication Sig Dispense Refill    predniSONE (DELTASONE) 20 mg tablet Take 20 mg by mouth daily (with breakfast) for 5 days. 5 Tablet 0    triamcinolone acetonide (KENALOG) 0.1 % ointment Apply  to affected area two (2) times a day for 14 days.  use thin layer 270 g 1    amLODIPine (NORVASC) 10 mg tablet Take 1 Tablet by mouth daily for 180 days. 30 Tablet 5    calcitRIOL (ROCALTROL) 0.25 mcg capsule Take 1 Capsule by mouth daily for 270 days. 90 Capsule 2    carvediloL (COREG) 6.25 mg tablet Take 1 Tablet by mouth two (2) times daily (with meals) for 360 days. She Cuts Tablet into 4 and Takes a quarter 180 Tablet 3    omeprazole (PRILOSEC) 40 mg capsule Take 1 Capsule by mouth daily for 360 days. 90 Capsule 3    insulin lispro (HUMALOG) 100 unit/mL injection Sliding scale 1 Each 2    Ferrous Fumarate 325 mg (106 mg iron) tab Take 325 mg by mouth daily for 90 days. 90 Tablet 3    sevelamer carbonate (RENVELA) 800 mg tab tab Take 1 Tablet by mouth three (3) times daily for 60 days. 90 Tablet 1    simvastatin (ZOCOR) 40 mg tablet Take 1 Tablet by mouth nightly for 360 days. 90 Tablet 3    folic acid (FOLVITE) 1 mg tablet Take 5 Tablets by mouth daily for 60 days. 150 Tablet 1    glucose (Dex4 Glucose) 4 gram chewable tablet Take 4 Tablets by mouth as needed (hypoglycemia) for up to 90 days. 90 Tablet 2    hydrOXYzine HCL (ATARAX) 10 mg tablet Take 1 Tablet by mouth three (3) times daily as needed for Itching for up to 10 days. 30 Tablet 0    methyldopa (ALDOMET) 250 mg tablet Take 1 Tablet by mouth three (3) times daily for 90 days. 270 Tablet 0    rashad. stocking,knee,reg,xlrg (Comp Stocking, Knee,Reg, X-Lrg) misc 2 pairs of stockings 4 Each 0    glucose blood VI test strips (ASCENSIA AUTODISC VI, ONE TOUCH ULTRA TEST VI) strip Check blood glucose 3 times a day 100 Strip 3    insulin pump (PATIENT SUPPLIED) misc by SubCUTAneous route continuous.  hydrALAZINE (APRESOLINE) 25 mg tablet Take 25 mg by mouth three (3) times daily.  aspirin delayed-release 81 mg tablet Take  by mouth daily.          Allergies  Allergies   Allergen Reactions    Amoclan [Amoxicillin-Pot Clavulanate] Swelling    Amitriptyline Itching    Benadryl [Diphenhydramine Hcl] Itching    Kiwi Itching    Pineapple Itching and Angioedema       Family History  History reviewed. No pertinent family history. Social History  Social History     Socioeconomic History    Marital status:      Spouse name: Not on file    Number of children: Not on file    Years of education: Not on file    Highest education level: Not on file   Occupational History    Not on file   Tobacco Use    Smoking status: Never Smoker    Smokeless tobacco: Never Used   Vaping Use    Vaping Use: Never used   Substance and Sexual Activity    Alcohol use: Never    Drug use: Never    Sexual activity: Yes     Partners: Male     Birth control/protection: None   Other Topics Concern    Not on file   Social History Narrative    Not on file     Social Determinants of Health     Financial Resource Strain:     Difficulty of Paying Living Expenses: Not on file   Food Insecurity:     Worried About Running Out of Food in the Last Year: Not on file    Carol of Food in the Last Year: Not on file   Transportation Needs:     Lack of Transportation (Medical): Not on file    Lack of Transportation (Non-Medical):  Not on file   Physical Activity:     Days of Exercise per Week: Not on file    Minutes of Exercise per Session: Not on file   Stress:     Feeling of Stress : Not on file   Social Connections:     Frequency of Communication with Friends and Family: Not on file    Frequency of Social Gatherings with Friends and Family: Not on file    Attends Evangelical Services: Not on file    Active Member of Clubs or Organizations: Not on file    Attends Club or Organization Meetings: Not on file    Marital Status: Not on file   Intimate Partner Violence:     Fear of Current or Ex-Partner: Not on file    Emotionally Abused: Not on file    Physically Abused: Not on file    Sexually Abused: Not on file   Housing Stability:     Unable to Pay for Housing in the Last Year: Not on file    Number of [place] in the Last Year: Not on file  Unstable Housing in the Last Year: Not on file       Review of Systems  Review of Systems   Constitutional: Negative for chills and fever. Respiratory: Negative for cough and shortness of breath. Cardiovascular: Negative for chest pain and leg swelling. Gastrointestinal: Negative for abdominal pain, nausea and vomiting. Skin: Positive for itching and rash. Neurological: Positive for dizziness. Negative for headaches. Hypoglycemia episodes          Vital Signs  Visit Vitals  BP (!) 145/68 (BP 1 Location: Left upper arm, BP Patient Position: Sitting, BP Cuff Size: Large adult)   Pulse (!) 58   Temp 99.3 °F (37.4 °C) (Temporal)   Ht 6' 1\" (1.854 m)   Wt 300 lb (136.1 kg)   LMP  (LMP Unknown)   SpO2 96%   BMI 39.58 kg/m²         Physical Exam  Physical Exam  Vitals reviewed. Constitutional:       General: She is not in acute distress. Appearance: Normal appearance. HENT:      Head: Normocephalic and atraumatic. Right Ear: External ear normal.      Left Ear: External ear normal.      Nose: Nose normal.      Mouth/Throat:      Mouth: Mucous membranes are moist.   Eyes:      Extraocular Movements: Extraocular movements intact. Conjunctiva/sclera: Conjunctivae normal.   Cardiovascular:      Rate and Rhythm: Normal rate and regular rhythm. Pulses: Normal pulses. Heart sounds: Normal heart sounds. No murmur heard. No gallop. Pulmonary:      Effort: Pulmonary effort is normal. No respiratory distress. Breath sounds: Normal breath sounds. No wheezing. Abdominal:      General: Bowel sounds are normal. There is no distension. Palpations: Abdomen is soft. Tenderness: There is no abdominal tenderness. Musculoskeletal:         General: Normal range of motion. Cervical back: Normal range of motion. Right lower leg: No edema. Left lower leg: No edema. Skin:     General: Skin is warm. Neurological:      General: No focal deficit present. Mental Status: She is alert and oriented to person, place, and time. Cranial Nerves: No cranial nerve deficit. Motor: No weakness. Gait: Gait normal.      Comments: Hypoglycemia episode where patient felt dizzy and lightheaded. Psychiatric:         Mood and Affect: Mood normal.         Behavior: Behavior normal.                Results  Results for orders placed or performed in visit on 03/01/22   AMB POC GLUCOSE BLOOD, BY GLUCOSE MONITORING DEVICE   Result Value Ref Range    Glucose POC 55 MG/DL       ASSESSMENT and PLAN  1. Allergic contact dermatitis, unspecified trigger  - predniSONE (DELTASONE) 20 mg tablet; Take 20 mg by mouth daily (with breakfast) for 5 days. Dispense: 5 Tablet; Refill: 0  - triamcinolone acetonide (KENALOG) 0.1 % ointment; Apply  to affected area two (2) times a day for 14 days. use thin layer  Dispense: 270 g; Refill: 1  - hydrOXYzine HCL (ATARAX) 10 mg tablet; Take 1 Tablet by mouth three (3) times daily as needed for Itching for up to 10 days. Dispense: 30 Tablet; Refill: 0    2. Type 2 diabetes mellitus with diabetic nephropathy, without long-term current use of insulin ([facility])  -follow up with endocrinologist  - insulin lispro (HUMALOG) 100 unit/mL injection; Sliding scale  Dispense: 1 Each; Refill: 2    3. Hypoglycemia  -advised to decrease insulin dose by 2 units every 3 days until hypoglycemia episodes decrease  -possible due to CKD and decreased excretion  - AMB POC GLUCOSE BLOOD, BY GLUCOSE MONITORING DEVICE  - glucose (Dex4 Glucose) 4 gram chewable tablet; Take 4 Tablets by mouth as needed (hypoglycemia) for up to 90 days. Dispense: 90 Tablet; Refill: 2    4. ESRD (end stage renal disease) ([facility])  - calcitRIOL (ROCALTROL) 0.25 mcg capsule; Take 1 Capsule by mouth daily for 270 days. Dispense: 90 Capsule; Refill: 2  - Ferrous Fumarate 325 mg (106 mg iron) tab; Take 325 mg by mouth daily for 90 days. Dispense: 90 Tablet;  Refill: 3  - sevelamer carbonate (RENVELA) 800 mg tab tab; Take 1 Tablet by mouth three (3) times daily for 60 days. Dispense: 90 Tablet; Refill: 1  - folic acid (FOLVITE) 1 mg tablet; Take 5 Tablets by mouth daily for 60 days. Dispense: 150 Tablet; Refill: 1    5. Primary hypertension  -advised to keep log of her blood pressure at home   -Advised low salt diet  -advised to follow up with nephrologist as well   -refilled all of her medication at this time  - amLODIPine (NORVASC) 10 mg tablet; Take 1 Tablet by mouth daily for 180 days. Dispense: 30 Tablet; Refill: 5  - carvediloL (COREG) 6.25 mg tablet; Take 1 Tablet by mouth two (2) times daily (with meals) for 360 days. She Cuts Tablet into 4 and Takes a quarter  Dispense: 180 Tablet; Refill: 3  - methyldopa (ALDOMET) 250 mg tablet; Take 1 Tablet by mouth three (3) times daily for 90 days. Dispense: 270 Tablet; Refill: 0    6. Gastroesophageal reflux disease, unspecified whether esophagitis present  - omeprazole (PRILOSEC) 40 mg capsule; Take 1 Capsule by mouth daily for 360 days. Dispense: 90 Capsule; Refill: 3    7. Folate deficiency  - FOLATE; Future    8. Mixed hyperlipidemia  - simvastatin (ZOCOR) 40 mg tablet; Take 1 Tablet by mouth nightly for 360 days. Dispense: 90 Tablet; Refill: 3           Follow-up and Dispositions    · Return in about 3 months (around 6/1/2022), or if symptoms worsen or fail to improve, for follow up HTN, DM. I have discussed the diagnosis with the patient and the intended plan of care as seen in the above orders. The patient has received an after-visit summary and questions were answered concerning future plans. I have discussed medication, side effects, and warnings with the patient in detail. The patient verbalized understanding and is in agreement with the plan of care. The patient will contact the office with any additional concerns. I spent at least 30 minutes on this visit with this established patient.     Latoya Cooper MD    PLEASE NOTE:   This document has been produced using voice recognition software.  Unrecognized errors in transcription may be present

## 2022-03-04 DIAGNOSIS — R92.2 INCONCLUSIVE MAMMOGRAM: ICD-10-CM

## 2022-03-04 DIAGNOSIS — I10 PRIMARY HYPERTENSION: ICD-10-CM

## 2022-03-04 DIAGNOSIS — E11.21 TYPE 2 DIABETES MELLITUS WITH DIABETIC NEPHROPATHY, WITHOUT LONG-TERM CURRENT USE OF INSULIN (HCC): ICD-10-CM

## 2022-03-04 DIAGNOSIS — N63.20 LEFT BREAST LUMP: ICD-10-CM

## 2022-03-04 DIAGNOSIS — B35.4 TINEA CORPORIS: Primary | ICD-10-CM

## 2022-03-04 RX ORDER — CHLORPHENIRAMINE MALEATE 4 MG
TABLET ORAL 2 TIMES DAILY
Qty: 85 G | Refills: 0 | Status: SHIPPED | OUTPATIENT
Start: 2022-03-04 | End: 2022-03-17 | Stop reason: SDUPTHER

## 2022-03-04 RX ORDER — METHYLDOPA 250 MG/1
250 TABLET, FILM COATED ORAL 3 TIMES DAILY
Qty: 270 TABLET | Refills: 3 | Status: SHIPPED | OUTPATIENT
Start: 2022-03-04 | End: 2022-03-27

## 2022-03-07 ENCOUNTER — TELEPHONE (OUTPATIENT)
Dept: FAMILY MEDICINE CLINIC | Age: 67
End: 2022-03-07

## 2022-03-07 DIAGNOSIS — E11.21 TYPE 2 DIABETES MELLITUS WITH DIABETIC NEPHROPATHY, WITHOUT LONG-TERM CURRENT USE OF INSULIN (HCC): Primary | ICD-10-CM

## 2022-03-07 RX ORDER — INSULIN LISPRO 100 [IU]/ML
INJECTION, SOLUTION INTRAVENOUS; SUBCUTANEOUS
Qty: 1 EACH | Refills: 2
Start: 2022-03-07 | End: 2022-04-25 | Stop reason: SDUPTHER

## 2022-03-07 NOTE — TELEPHONE ENCOUNTER
Pt calling because the pharmacy hasn't received her prescription yet.  Pt would like her insulin sent to [facility]

## 2022-03-07 NOTE — TELEPHONE ENCOUNTER
Can you resend this (Insulin prescription) it doesn't look like it was confirmed by the pharmacy.     Thank you

## 2022-03-08 ENCOUNTER — DOCUMENTATION ONLY (OUTPATIENT)
Dept: FAMILY MEDICINE CLINIC | Age: 67
End: 2022-03-08

## 2022-03-08 DIAGNOSIS — I10 PRIMARY HYPERTENSION: ICD-10-CM

## 2022-03-08 RX ORDER — HYDRALAZINE HYDROCHLORIDE 50 MG/1
50 TABLET, FILM COATED ORAL 3 TIMES DAILY
Qty: 270 TABLET | Refills: 0 | Status: SHIPPED | OUTPATIENT
Start: 2022-03-08 | End: 2022-03-25 | Stop reason: SDUPTHER

## 2022-03-08 RX ORDER — NIFEDIPINE 60 MG/1
60 TABLET, EXTENDED RELEASE ORAL DAILY
Qty: 90 TABLET | Refills: 0 | Status: SHIPPED | OUTPATIENT
Start: 2022-03-08 | End: 2022-03-25 | Stop reason: SDUPTHER

## 2022-03-08 NOTE — TELEPHONE ENCOUNTER
Methyldopa was discontinued and patient was started on nifedipine 60 mg extended release once a day and dose of hydralazine was increased to 50 mg 3 times a day. Amlodipine was also discontinued.  Advised patient to come in to the office should her blood pressure not be well controlled for an office visit

## 2022-03-08 NOTE — TELEPHONE ENCOUNTER
Spoke with pharmacy they did not receive the prescription-verbal given because it was sent but not confirmed by the pharmacy.

## 2022-03-08 NOTE — TELEPHONE ENCOUNTER
Pt stated she called [facility] DR LICHA THRASHER on [address] [place] [address] DrChristal and they have not received the medication for Humalog. Ms. Bartholome Closs would like to speak to a nurse as soon as possible. She can be reached at [phone]. Please advise.  Thank you!!!

## 2022-03-09 DIAGNOSIS — N63.20 LEFT BREAST MASS: Primary | ICD-10-CM

## 2022-03-16 ENCOUNTER — OFFICE VISIT (OUTPATIENT)
Dept: CARDIOLOGY CLINIC | Age: 67
End: 2022-03-16

## 2022-03-16 VITALS — HEIGHT: 72 IN | BODY MASS INDEX: 39.58 KG/M2

## 2022-03-16 DIAGNOSIS — R06.09 EXERTIONAL DYSPNEA: Primary | ICD-10-CM

## 2022-03-16 DIAGNOSIS — R60.9 SWELLING: ICD-10-CM

## 2022-03-17 ENCOUNTER — HOSPITAL ENCOUNTER (OUTPATIENT)
Dept: MAMMOGRAPHY | Age: 67
Discharge: HOME | End: 2022-03-17
Attending: FAMILY MEDICINE
Payer: MEDICARE

## 2022-03-17 ENCOUNTER — HOSPITAL ENCOUNTER (OUTPATIENT)
Dept: ULTRASOUND IMAGING | Age: 67
Discharge: HOME | End: 2022-03-17
Attending: FAMILY MEDICINE
Payer: MEDICARE

## 2022-03-17 DIAGNOSIS — E11.21 TYPE 2 DIABETES MELLITUS WITH DIABETIC NEPHROPATHY, WITHOUT LONG-TERM CURRENT USE OF INSULIN (HCC): ICD-10-CM

## 2022-03-17 DIAGNOSIS — N63.20 LEFT BREAST MASS: ICD-10-CM

## 2022-03-17 DIAGNOSIS — N63.20 LEFT BREAST LUMP: ICD-10-CM

## 2022-03-17 DIAGNOSIS — R92.2 INCONCLUSIVE MAMMOGRAM: ICD-10-CM

## 2022-03-17 DIAGNOSIS — B35.4 TINEA CORPORIS: ICD-10-CM

## 2022-03-17 PROCEDURE — 77061 BREAST TOMOSYNTHESIS UNI: CPT

## 2022-03-17 PROCEDURE — 3044F HG A1C LEVEL LT 7.0%: CPT | Performed by: FAMILY MEDICINE

## 2022-03-17 PROCEDURE — 76642 ULTRASOUND BREAST LIMITED: CPT

## 2022-03-17 RX ORDER — CHLORPHENIRAMINE MALEATE 4 MG
TABLET ORAL 2 TIMES DAILY
Qty: 85 G | Refills: 0 | Status: SHIPPED | OUTPATIENT
Start: 2022-03-17

## 2022-03-17 RX ORDER — INSULIN LISPRO 100 [IU]/ML
INJECTION, SOLUTION INTRAVENOUS; SUBCUTANEOUS
Qty: 1 EACH | Refills: 2
Start: 2022-03-17 | End: 2022-04-26

## 2022-03-17 NOTE — TELEPHONE ENCOUNTER
This patient contacted office for the following prescriptions to be filled:      Medication requested :   Requested Prescriptions     Pending Prescriptions Disp Refills    insulin lispro (HUMALOG) 100 unit/mL injection 1 Each 2     Sig: Sliding scale    clotrimazole (LOTRIMIN) 1 % topical cream 85 g 0     Sig: Apply  to affected area two (2) times a day.      Pt would like a prescription for [place] smooth  PCP: Dr. Ruddy Tan  Mail order or Local pharmacy name: Dr. Ruddy Tan

## 2022-03-17 NOTE — PROGRESS NOTES
Would you mind telling Ms. Petra Khalil that her diagnostic mammogram and Us of the left breast showed likely benign (not cancerous) findings and recommendation is to follow up with diagnostic mammogram and Us of the left breast in 6 months to make sure findings are not evolving. Thank you.

## 2022-03-17 NOTE — TELEPHONE ENCOUNTER
Please see patient refills request    Patient was advised the Humalog was sent in with additional refill and to check with the pharmacy    Patient requesting an RX for Derma Smoothe-(Not a current med) Advised that the provider was out of the office until next week-very persistant that this request along with a refill of Atarax be given to the provider covering.     Atarax was last prescribed on 3-1-22  #30 X 0    Thank you

## 2022-03-18 PROBLEM — E11.9 TYPE 2 DIABETES MELLITUS (HCC): Status: ACTIVE | Noted: 2022-03-01

## 2022-03-23 DIAGNOSIS — B35.4 TINEA CORPORIS: ICD-10-CM

## 2022-03-23 DIAGNOSIS — L23.9 ALLERGIC CONTACT DERMATITIS, UNSPECIFIED TRIGGER: ICD-10-CM

## 2022-03-23 RX ORDER — FLUOCINOLONE ACETONIDE 0.11 MG/ML
OIL TOPICAL
Qty: 118.28 ML | Refills: 1 | Status: SHIPPED | OUTPATIENT
Start: 2022-03-23

## 2022-03-23 NOTE — TELEPHONE ENCOUNTER
Referral placed for dermatologist as patient has been having skin itching and lesions for more than 2 months with no relief despite use of antifungal or steroid creams.  Also prescribed derma-smooth

## 2022-03-23 NOTE — PROGRESS NOTES
Pt. Verified identity and Verbalized understanding of Lab results. She asked  Can Derma Smooth oil be prescribed to her for inching nothing is helping.  Also can you send an referral for an dermatologist.

## 2022-03-25 DIAGNOSIS — N18.6 ESRD (END STAGE RENAL DISEASE) (HCC): ICD-10-CM

## 2022-03-25 DIAGNOSIS — E78.2 MIXED HYPERLIPIDEMIA: ICD-10-CM

## 2022-03-25 DIAGNOSIS — I10 PRIMARY HYPERTENSION: ICD-10-CM

## 2022-03-25 DIAGNOSIS — K21.9 GASTROESOPHAGEAL REFLUX DISEASE, UNSPECIFIED WHETHER ESOPHAGITIS PRESENT: ICD-10-CM

## 2022-03-25 NOTE — TELEPHONE ENCOUNTER
This pharmacy faxed over request for the following prescriptions to be filled:    Medication requested :   Requested Prescriptions     Pending Prescriptions Disp Refills    simvastatin (ZOCOR) 40 mg tablet 90 Tablet 3     Sig: Take 1 Tablet by mouth nightly for 360 days.  omeprazole (PRILOSEC) 40 mg capsule 90 Capsule 3     Sig: Take 1 Capsule by mouth daily for 360 days.  carvediloL (COREG) 6.25 mg tablet 180 Tablet 3     Sig: Take 1 Tablet by mouth two (2) times daily (with meals) for 360 days. She Cuts Tablet into 4 and Takes a quarter    sevelamer carbonate (RENVELA) 800 mg tab tab 90 Tablet 1     Sig: Take 1 Tablet by mouth three (3) times daily for 60 days.  calcitRIOL (ROCALTROL) 0.25 mcg capsule 90 Capsule 2     Sig: Take 1 Capsule by mouth daily for 270 days.  hydrALAZINE (APRESOLINE) 50 mg tablet 270 Tablet 0     Sig: Take 1 Tablet by mouth three (3) times daily for 90 days.  NIFEdipine ER (ADALAT CC) 60 mg ER tablet 90 Tablet 0     Sig: Take 1 Tablet by mouth daily for 90 days.            PCP: Dr. Raul Wright or Print: [id number]  Mail order or Local pharmacy: [address]

## 2022-03-25 NOTE — TELEPHONE ENCOUNTER
Pt. Is requested a new Endocrinologist with a sooner appointment. Please Advise. Miracle Gonzalez do we have a list of Endocrinologists I can give her to call?

## 2022-03-25 NOTE — TELEPHONE ENCOUNTER
----- Message from Richard Carroll sent at 3/24/2022 10:43 AM EDT -----  Subject: Referral Request    QUESTIONS   Reason for referral request? Endocrinologist   Has the physician seen you for this condition before? No   Preferred Specialist (if applicable)? Do you already have an appointment scheduled? No  Additional Information for Provider? Please call patient to convey details   of referral.   ---------------------------------------------------------------------------  --------------  CALL BACK INFO  What is the best way for the office to contact you? OK to leave message on   voicemail  Preferred Call Back Phone Number?  [phone]

## 2022-03-27 RX ORDER — NIFEDIPINE 60 MG/1
60 TABLET, EXTENDED RELEASE ORAL DAILY
Qty: 90 TABLET | Refills: 3 | Status: SHIPPED | OUTPATIENT
Start: 2022-03-27 | End: 2022-06-25

## 2022-03-27 RX ORDER — SEVELAMER CARBONATE 800 MG/1
800 TABLET, FILM COATED ORAL 3 TIMES DAILY
Qty: 90 TABLET | Refills: 4 | Status: SHIPPED | OUTPATIENT
Start: 2022-03-27 | End: 2022-05-12

## 2022-03-27 RX ORDER — OMEPRAZOLE 40 MG/1
40 CAPSULE, DELAYED RELEASE ORAL DAILY
Qty: 90 CAPSULE | Refills: 3 | Status: SHIPPED | OUTPATIENT
Start: 2022-03-27 | End: 2023-03-22

## 2022-03-27 RX ORDER — CARVEDILOL 6.25 MG/1
6.25 TABLET ORAL 2 TIMES DAILY WITH MEALS
Qty: 180 TABLET | Refills: 3 | Status: SHIPPED | OUTPATIENT
Start: 2022-03-27 | End: 2023-03-22

## 2022-03-27 RX ORDER — SIMVASTATIN 40 MG/1
40 TABLET, FILM COATED ORAL
Qty: 90 TABLET | Refills: 3 | Status: SHIPPED | OUTPATIENT
Start: 2022-03-27 | End: 2023-03-22

## 2022-03-27 RX ORDER — CALCITRIOL 0.25 UG/1
0.25 CAPSULE ORAL DAILY
Qty: 90 CAPSULE | Refills: 3 | Status: SHIPPED | OUTPATIENT
Start: 2022-03-27 | End: 2022-12-22

## 2022-03-27 RX ORDER — HYDRALAZINE HYDROCHLORIDE 50 MG/1
50 TABLET, FILM COATED ORAL 3 TIMES DAILY
Qty: 270 TABLET | Refills: 3 | Status: SHIPPED | OUTPATIENT
Start: 2022-03-27 | End: 2022-06-25

## 2022-04-05 ENCOUNTER — NURSE TRIAGE (OUTPATIENT)
Dept: OTHER | Facility: CLINIC | Age: 67
End: 2022-04-05

## 2022-04-05 NOTE — TELEPHONE ENCOUNTER
Received call from [address] at [facility] with Red Flag Complaint. Subjective: Caller states \"it will swell off and on and it will go down, and now for a week or so its swollen and paining and the pain is a burning kind of pain/ tingling \"     Current Symptoms: L leg swelling right above knee/ thigh area and pain      Onset: 1 week ago; worsening    Associated Symptoms: NA    Pain Severity: 8/10; burning; constant    Temperature: Denies     What has been tried: I have on a compression stocking and its elevated with no relief     LMP: NA Pregnant: NA    Recommended disposition: See HCP within 4 Hours (or PCP triage) writer attempted to contact pt PCP, there was no answer/ office closed. Based on pt symptoms, pt advised to go to ED for R/O DVT. Pt was initially reluctant, but agreed to to go ED once writer explained risks associated with not seeking medical attention. Care advice provided, patient verbalizes understanding; denies any other questions or concerns; instructed to call back for any new or worsening symptoms. Patient/caller agrees to proceed to [facility] Emergency Department    Attention Provider: Thank you for allowing me to participate in the care of your patient. The patient was connected to triage in response to information provided to the [facility]. Please do not respond through this encounter as the response is not directed to a shared pool.     Reason for Disposition   [1] Thigh or calf pain AND [2] only 1 side AND [3] present > 1 hour    Protocols used: LEG SWELLING AND EDEMA-ADULT-

## 2022-04-15 ENCOUNTER — HOSPITAL ENCOUNTER (OUTPATIENT)
Dept: NON INVASIVE DIAGNOSTICS | Age: 67
Discharge: HOME | End: 2022-04-15
Attending: INTERNAL MEDICINE
Payer: MEDICARE

## 2022-04-15 VITALS
SYSTOLIC BLOOD PRESSURE: 145 MMHG | BODY MASS INDEX: 39.68 KG/M2 | DIASTOLIC BLOOD PRESSURE: 68 MMHG | WEIGHT: 293 LBS | HEIGHT: 72 IN

## 2022-04-15 DIAGNOSIS — R60.9 SWELLING: ICD-10-CM

## 2022-04-15 DIAGNOSIS — R06.09 EXERTIONAL DYSPNEA: ICD-10-CM

## 2022-04-15 DIAGNOSIS — R06.02 SOB (SHORTNESS OF BREATH): ICD-10-CM

## 2022-04-15 LAB
ECHO AO ASC DIAM: 3 CM
ECHO AO ASCENDING AORTA INDEX: 1.17 CM/M2
ECHO AO ROOT DIAM: 3.2 CM
ECHO AO ROOT INDEX: 1.25 CM/M2
ECHO AV AREA PEAK VELOCITY: 1.6 CM2
ECHO AV AREA VTI: 1.7 CM2
ECHO AV AREA/BSA PEAK VELOCITY: 0.6 CM2/M2
ECHO AV AREA/BSA VTI: 0.7 CM2/M2
ECHO AV MEAN GRADIENT: 11 MMHG
ECHO AV MEAN VELOCITY: 1.6 M/S
ECHO AV PEAK GRADIENT: 19 MMHG
ECHO AV PEAK VELOCITY: 2.2 M/S
ECHO AV VELOCITY RATIO: 0.36
ECHO AV VTI: 48.1 CM
ECHO EST RA PRESSURE: 3 MMHG
ECHO LA VOL 2C: 71 ML (ref 22–52)
ECHO LA VOL 4C: 81 ML (ref 22–52)
ECHO LA VOLUME AREA LENGTH: 87 ML
ECHO LA VOLUME INDEX A2C: 28 ML/M2 (ref 16–34)
ECHO LA VOLUME INDEX A4C: 32 ML/M2 (ref 16–34)
ECHO LA VOLUME INDEX AREA LENGTH: 34 ML/M2 (ref 16–34)
ECHO LV E' LATERAL VELOCITY: 8 CM/S
ECHO LV FRACTIONAL SHORTENING: 24 % (ref 28–44)
ECHO LV INTERNAL DIMENSION DIASTOLE INDEX: 1.45 CM/M2
ECHO LV INTERNAL DIMENSION DIASTOLIC: 3.7 CM (ref 3.9–5.3)
ECHO LV INTERNAL DIMENSION SYSTOLIC INDEX: 1.09 CM/M2
ECHO LV INTERNAL DIMENSION SYSTOLIC: 2.8 CM
ECHO LV IVSD: 2.2 CM (ref 0.6–0.9)
ECHO LV MASS 2D: 323.7 G (ref 67–162)
ECHO LV MASS INDEX 2D: 126.4 G/M2 (ref 43–95)
ECHO LV POSTERIOR WALL DIASTOLIC: 1.7 CM (ref 0.6–0.9)
ECHO LV RELATIVE WALL THICKNESS RATIO: 0.92
ECHO LVOT AREA: 4.2 CM2
ECHO LVOT AV VTI INDEX: 0.4
ECHO LVOT DIAM: 2.3 CM
ECHO LVOT MEAN GRADIENT: 2 MMHG
ECHO LVOT PEAK GRADIENT: 3 MMHG
ECHO LVOT PEAK VELOCITY: 0.8 M/S
ECHO LVOT STROKE VOLUME INDEX: 31.5 ML/M2
ECHO LVOT SV: 80.6 ML
ECHO LVOT VTI: 19.4 CM
ECHO MV A VELOCITY: 1.35 M/S
ECHO MV E DECELERATION TIME (DT): 169.6 MS
ECHO MV E VELOCITY: 1.09 M/S
ECHO MV E/A RATIO: 0.81
ECHO MV E/E' LATERAL: 13.63

## 2022-04-15 PROCEDURE — 93306 TTE W/DOPPLER COMPLETE: CPT

## 2022-04-19 ENCOUNTER — OFFICE VISIT (OUTPATIENT)
Dept: CARDIOLOGY CLINIC | Age: 67
End: 2022-04-19
Payer: MEDICARE

## 2022-04-19 VITALS
BODY MASS INDEX: 39.68 KG/M2 | DIASTOLIC BLOOD PRESSURE: 70 MMHG | SYSTOLIC BLOOD PRESSURE: 136 MMHG | HEIGHT: 72 IN | WEIGHT: 293 LBS | HEART RATE: 61 BPM | OXYGEN SATURATION: 97 %

## 2022-04-19 DIAGNOSIS — R60.9 SWELLING: ICD-10-CM

## 2022-04-19 DIAGNOSIS — R06.02 SOB (SHORTNESS OF BREATH): Primary | ICD-10-CM

## 2022-04-19 PROCEDURE — G8399 PT W/DXA RESULTS DOCUMENT: HCPCS | Performed by: INTERNAL MEDICINE

## 2022-04-19 PROCEDURE — G8427 DOCREV CUR MEDS BY ELIG CLIN: HCPCS | Performed by: INTERNAL MEDICINE

## 2022-04-19 PROCEDURE — G8536 NO DOC ELDER MAL SCRN: HCPCS | Performed by: INTERNAL MEDICINE

## 2022-04-19 PROCEDURE — 1090F PRES/ABSN URINE INCON ASSESS: CPT | Performed by: INTERNAL MEDICINE

## 2022-04-19 PROCEDURE — 3017F COLORECTAL CA SCREEN DOC REV: CPT | Performed by: INTERNAL MEDICINE

## 2022-04-19 PROCEDURE — G9231 DOC ESRD DIA TRANS PREG: HCPCS | Performed by: INTERNAL MEDICINE

## 2022-04-19 PROCEDURE — 99214 OFFICE O/P EST MOD 30 MIN: CPT | Performed by: INTERNAL MEDICINE

## 2022-04-19 PROCEDURE — G8510 SCR DEP NEG, NO PLAN REQD: HCPCS | Performed by: INTERNAL MEDICINE

## 2022-04-19 PROCEDURE — 93000 ELECTROCARDIOGRAM COMPLETE: CPT | Performed by: INTERNAL MEDICINE

## 2022-04-19 PROCEDURE — 1101F PT FALLS ASSESS-DOCD LE1/YR: CPT | Performed by: INTERNAL MEDICINE

## 2022-04-19 PROCEDURE — G9899 SCRN MAM PERF RSLTS DOC: HCPCS | Performed by: INTERNAL MEDICINE

## 2022-04-19 PROCEDURE — G8417 CALC BMI ABV UP PARAM F/U: HCPCS | Performed by: INTERNAL MEDICINE

## 2022-04-19 RX ORDER — TORSEMIDE 20 MG/1
TABLET ORAL
COMMUNITY
Start: 2022-04-05

## 2022-04-19 NOTE — PROGRESS NOTES
Cory Conley presents today for   Chief Complaint   Patient presents with    Follow-up     follow up after testing     Leg Swelling     bilateral lower extremities edema        Mariana Larsson Arlie Castleman preferred language for health care discussion is english/other. Is someone accompanying this pt? no    Is the patient using any DME equipment during [address]? no    Depression Screening:  3 most recent PHQ Screens 4/19/2022   Little interest or pleasure in doing things Not at all   Feeling down, depressed, irritable, or hopeless Not at all   Total Score PHQ 2 0       Learning Assessment:  Learning Assessment 2/16/2022   PRIMARY LEARNER Patient   PRIMARY LANGUAGE ENGLISH   LEARNER PREFERENCE PRIMARY DEMONSTRATION   ANSWERED BY patient   RELATIONSHIP SELF       Abuse Screening:  Abuse Screening Questionnaire 4/19/2022   Do you ever feel afraid of your partner? N   Are you in a relationship with someone who physically or mentally threatens you? N   Is it safe for you to go home? Y       Fall Risk  Fall Risk Assessment, last 12 mths 4/19/2022   Able to walk? Yes   Fall in past 12 months? 0   Do you feel unsteady? 0   Are you worried about falling 0       Pt currently taking Anticoagulant therapy? no    Coordination of Care:  1. Have you been to the ER, urgent care clinic since your last visit? Hospitalized since your last visit? no    2. Have you seen or consulted any other health care providers outside of the [address] [place] [address] since your last visit? Include any pap smears or colon screening.  no

## 2022-04-19 NOTE — PROGRESS NOTES
HISTORY OF PRESENT ILLNESS  Iesha Loomis is a 73 y.o. female. ASSESSMENT and PLAN    Ms. Stephanie Hardin has no prior history of heart disease. Around 2019, she had episode of increased ENGEL. She is from [facility] and had evaluation at that time including an echocardiogram and was told that her heart was doing fine. From November 2021 until February 2022, she has had significantly worsening dyspnea on exertion, as well as severe swelling. Of note, since mid 2021, she has been sleeping in the chair because of shortness of breath when she lays down. She has history of diabetes mellitus, insulin pump, and diabetic kidney disease. She has history of right breast carcinoma status post mastectomy and reconstruction back in 1997. She did have chemotherapy. She had hysterectomy performed in 1999. She did have sleep study performed around 2008 in [place]. She was told that this was unremarkable. Her echocardiogram done April 2022 showed overall normal LV function. However, there is severe septal as well as posterior wall thickening. No obvious pulmonary hypertension was noted. I did advise her not to get dehydrated. Her renal function is severely diminished with GFR less than 15. · CAD:    He has no documented history of CAD. · BP:    Well controlled at 136/70. · Rhythm:    Stable sinus rhythm at 61 bpm.  · CHF:    There is no evidence of decompensated CHF noted. · Weight:     Her weight today is 295 pounds. Her previous weight was 309 pounds. Continued weight control has been recommended. · Cholesterol:   Target LDL <110. Zocor 40. · Anti-platelet:   Remains on ASA. With her significant hypertrophy and small LV size, I advised her not to get dehydrated. If there is decreased filling, she will likely develop worsening cardiac output leading to increased ENGEL. I will see her back in 6 months. Thank you. Encounter Diagnoses   Name Primary?     SOB (shortness of breath) Yes    Swelling      current treatment plan is effective, no change in therapy  lab results and schedule of future lab studies reviewed with patient  reviewed diet, exercise and weight control  cardiovascular risk and specific lipid/LDL goals reviewed  use of aspirin to prevent MI and TIA's discussed      HPI   Today, Ms. Lakeisha Gonzalez has no complaints of chest pains. She continues have mild bilateral lower extremity edema as well as increased ENGEL. She does not have any chest pains. Her echocardiogram done earlier this month revealed normal LV function but significant septal and posterior hypertrophy as well as small LV size. This may be from longstanding hypertension. Because of her hypertrophy, if she gets dehydrated, her bleeding will get worse. She may develop tachycardic events. This was all explained to the patient. Review of Systems   Respiratory: Positive for shortness of breath. Cardiovascular: Positive for leg swelling. Negative for chest pain, palpitations, orthopnea, claudication and PND. All other systems reviewed and are negative. Physical Exam  Vitals and nursing note reviewed. Constitutional:       Appearance: She is obese. HENT:      Head: Normocephalic. Eyes:      Conjunctiva/sclera: Conjunctivae normal.   Neck:      Vascular: No carotid bruit. Cardiovascular:      Rate and Rhythm: Normal rate and regular rhythm. Pulmonary:      Breath sounds: Normal breath sounds. Abdominal:      Palpations: Abdomen is soft. Musculoskeletal:         General: No swelling. Cervical back: No rigidity. Skin:     General: Skin is warm and dry. Neurological:      General: No focal deficit present. Mental Status: She is alert and oriented to person, place, and time.    Psychiatric:         Mood and Affect: Mood normal.         Behavior: Behavior normal.         PCP: Ethan Rico MD    Past Medical History:   Diagnosis Date    Asthma     Breast cancer ([facility]     Cancer ([facility]     Diabetes ([facility]     Elevated serum creatinine     ESRD (end stage renal disease) (HCC)     Hypertension     Kidney failure     Serum potassium elevated        Past Surgical History:   Procedure Laterality Date    HX BILATERAL MASTECTOMY      HX BREAST BIOPSY      HX BREAST RECONSTRUCTION      HX HYSTERECTOMY      HX TONSILLECTOMY         Current Outpatient Medications   Medication Sig Dispense Refill    torsemide (DEMADEX) 20 mg tablet TAKE 2 TABLETS BY MOUTH EVERY DAY UNTIL SWELLING GOES DOWN THEN 1/2 TABLET BY MOUTH DAILY      simvastatin (ZOCOR) 40 mg tablet Take 1 Tablet by mouth nightly for 360 days. 90 Tablet 3    omeprazole (PRILOSEC) 40 mg capsule Take 1 Capsule by mouth daily for 360 days. 90 Capsule 3    carvediloL (COREG) 6.25 mg tablet Take 1 Tablet by mouth two (2) times daily (with meals) for 360 days. She Cuts Tablet into 4 and Takes a quarter 180 Tablet 3    sevelamer carbonate (RENVELA) 800 mg tab tab Take 1 Tablet by mouth three (3) times daily for 60 days. 90 Tablet 4    calcitRIOL (ROCALTROL) 0.25 mcg capsule Take 1 Capsule by mouth daily for 270 days. 90 Capsule 3    hydrALAZINE (APRESOLINE) 50 mg tablet Take 1 Tablet by mouth three (3) times daily for 90 days. 270 Tablet 3    NIFEdipine ER (ADALAT CC) 60 mg ER tablet Take 1 Tablet by mouth daily for 90 days. 90 Tablet 3    fluocinolone (Derma-Smoothe/FS Body Oil) 0.01 % external oil Apply to affected areas twice a day for 14 days 118.28 mL 1    insulin lispro (HUMALOG) 100 unit/mL injection Sliding scale 1 Each 2    clotrimazole (LOTRIMIN) 1 % topical cream Apply  to affected area two (2) times a day. 85 g 0    insulin lispro (HUMALOG) 100 unit/mL injection To be used with insulin pump. 1 Each 2    insulin lispro 100 unit/mL soln injection To be used with insulin pump. Patient just needs vials of humalog. 1 Vial 2    Ferrous Fumarate 325 mg (106 mg iron) tab Take 325 mg by mouth daily for 90 days.  90 Tablet 3    folic acid (FOLVITE) 1 mg tablet Take 5 Tablets by mouth daily for 60 days. 150 Tablet 1    glucose (Dex4 Glucose) 4 gram chewable tablet Take 4 Tablets by mouth as needed (hypoglycemia) for up to 90 days. 90 Tablet 2    rashad. stocking,knee,reg,xlrg (Comp Stocking, Knee,Reg, X-Lrg) misc 2 pairs of stockings 4 Each 0    glucose blood VI test strips (ASCENSIA AUTODISC VI, ONE TOUCH ULTRA TEST VI) strip Check blood glucose 3 times a day 100 Strip 3    insulin pump (PATIENT SUPPLIED) misc by SubCUTAneous route continuous.  aspirin delayed-release 81 mg tablet Take  by mouth daily. The patient has a family history of    Social History     Tobacco Use    Smoking status: Never Smoker    Smokeless tobacco: Never Used   Vaping Use    Vaping Use: Never used   Substance Use Topics    Alcohol use: Never    Drug use: Never       Lab Results   Component Value Date/Time    Cholesterol, total 64 01/25/2022 12:41 PM    HDL Cholesterol 55 01/25/2022 12:41 PM    LDL, calculated Cannot be calculated 01/25/2022 12:41 PM    Triglyceride 49 01/25/2022 12:41 PM    CHOL/HDL Ratio 1.2 01/25/2022 12:41 PM        BP Readings from Last 3 Encounters:   04/19/22 136/70   04/15/22 (!) 145/68   03/01/22 (!) 145/68        Pulse Readings from Last 3 Encounters:   04/19/22 61   03/01/22 (!) 58   02/16/22 63       Wt Readings from Last 3 Encounters:   04/19/22 133.8 kg (295 lb)   04/15/22 136.1 kg (300 lb)   03/01/22 136.1 kg (300 lb)         EKG: normal EKG, normal sinus rhythm, unchanged from previous tracings.

## 2022-04-21 NOTE — PROGRESS NOTES
Per last office visit:  From November 2021 until February 2022, she has had significantly worsening dyspnea on exertion, as well as severe swelling. Of note, since mid 2021, she has been sleeping in the chair because of shortness of breath when she lays down. Her echocardiogram done April 2022 showed overall normal LV function. However, there is severe septal as well as posterior wall thickening. No obvious pulmonary hypertension was noted. I did advise her not to get dehydrated. Her renal function is severely diminished with GFR less than 15.

## 2022-04-22 ENCOUNTER — HOSPITAL ENCOUNTER (OUTPATIENT)
Dept: LAB | Age: 67
Discharge: HOME | End: 2022-04-22
Payer: MEDICARE

## 2022-04-22 LAB
ALBUMIN SERPL-MCNC: 3.7 G/DL (ref 3.4–5)
ANION GAP SERPL CALC-SCNC: 5 MMOL/L (ref 3–18)
BASOPHILS # BLD: 0.1 K/UL (ref 0–0.1)
BASOPHILS NFR BLD: 1 % (ref 0–2)
BUN SERPL-MCNC: 53 MG/DL (ref 7–18)
BUN/CREAT SERPL: 15 (ref 12–20)
CALCIUM SERPL-MCNC: 9.3 MG/DL (ref 8.5–10.1)
CALCIUM SERPL-MCNC: 9.5 MG/DL (ref 8.5–10.1)
CHLORIDE SERPL-SCNC: 112 MMOL/L (ref 100–111)
CO2 SERPL-SCNC: 26 MMOL/L (ref 21–32)
CREAT SERPL-MCNC: 3.6 MG/DL (ref 0.6–1.3)
CREAT UR-MCNC: 157 MG/DL (ref 30–125)
DIFFERENTIAL METHOD BLD: ABNORMAL
EOSINOPHIL # BLD: 0.4 K/UL (ref 0–0.4)
EOSINOPHIL NFR BLD: 6 % (ref 0–5)
ERYTHROCYTE [DISTWIDTH] IN BLOOD BY AUTOMATED COUNT: 15.7 % (ref 11.6–14.5)
GLUCOSE SERPL-MCNC: 90 MG/DL (ref 74–99)
HCT VFR BLD AUTO: 36.5 % (ref 35–45)
HGB BLD-MCNC: 10.2 G/DL (ref 12–16)
IMM GRANULOCYTES # BLD AUTO: 0.1 K/UL (ref 0–0.04)
IMM GRANULOCYTES NFR BLD AUTO: 1 % (ref 0–0.5)
LYMPHOCYTES # BLD: 1.8 K/UL (ref 0.9–3.6)
LYMPHOCYTES NFR BLD: 28 % (ref 21–52)
MCH RBC QN AUTO: 24.4 PG (ref 24–34)
MCHC RBC AUTO-ENTMCNC: 27.9 G/DL (ref 31–37)
MCV RBC AUTO: 87.3 FL (ref 78–100)
MICROALBUMIN UR-MCNC: 108 MG/DL (ref 0–3)
MICROALBUMIN/CREAT UR-RTO: 688 MG/G (ref 0–30)
MONOCYTES # BLD: 0.4 K/UL (ref 0.05–1.2)
MONOCYTES NFR BLD: 6 % (ref 3–10)
NEUTS SEG # BLD: 3.6 K/UL (ref 1.8–8)
NEUTS SEG NFR BLD: 58 % (ref 40–73)
NRBC # BLD: 0 K/UL (ref 0–0.01)
NRBC BLD-RTO: 0 PER 100 WBC
PHOSPHATE SERPL-MCNC: 4.7 MG/DL (ref 2.5–4.9)
PLATELET # BLD AUTO: 251 K/UL (ref 135–420)
PLATELET COMMENTS,PCOM: ABNORMAL
PMV BLD AUTO: 11.9 FL (ref 9.2–11.8)
POTASSIUM SERPL-SCNC: 5 MMOL/L (ref 3.5–5.5)
PROT UR-MCNC: 150 MG/DL
PTH-INTACT SERPL-MCNC: 734.2 PG/ML (ref 18.4–88)
RBC # BLD AUTO: 4.18 M/UL (ref 4.2–5.3)
RBC MORPH BLD: ABNORMAL
RBC MORPH BLD: ABNORMAL
SODIUM SERPL-SCNC: 143 MMOL/L (ref 136–145)
WBC # BLD AUTO: 6.4 K/UL (ref 4.6–13.2)

## 2022-04-22 PROCEDURE — 85025 COMPLETE CBC W/AUTO DIFF WBC: CPT

## 2022-04-22 PROCEDURE — 84156 ASSAY OF PROTEIN URINE: CPT

## 2022-04-22 PROCEDURE — 83970 ASSAY OF PARATHORMONE: CPT

## 2022-04-22 PROCEDURE — 80069 RENAL FUNCTION PANEL: CPT

## 2022-04-22 PROCEDURE — 36415 COLL VENOUS BLD VENIPUNCTURE: CPT

## 2022-04-22 PROCEDURE — 82043 UR ALBUMIN QUANTITATIVE: CPT

## 2022-04-25 DIAGNOSIS — E11.21 TYPE 2 DIABETES MELLITUS WITH DIABETIC NEPHROPATHY, WITHOUT LONG-TERM CURRENT USE OF INSULIN (HCC): ICD-10-CM

## 2022-04-25 DIAGNOSIS — N18.6 ESRD (END STAGE RENAL DISEASE) (HCC): ICD-10-CM

## 2022-04-26 DIAGNOSIS — E11.21 TYPE 2 DIABETES MELLITUS WITH DIABETIC NEPHROPATHY, WITHOUT LONG-TERM CURRENT USE OF INSULIN (HCC): Primary | ICD-10-CM

## 2022-04-26 RX ORDER — INSULIN LISPRO 100 [IU]/ML
INJECTION, SOLUTION INTRAVENOUS; SUBCUTANEOUS
Qty: 1 EACH | Refills: 5
Start: 2022-04-26 | End: 2022-04-26

## 2022-04-26 RX ORDER — FOLIC ACID 1 MG/1
5 TABLET ORAL DAILY
Qty: 300 TABLET | Refills: 3 | Status: SHIPPED | OUTPATIENT
Start: 2022-04-26 | End: 2022-06-25

## 2022-05-06 ENCOUNTER — TELEPHONE (OUTPATIENT)
Dept: CARDIOLOGY CLINIC | Age: 67
End: 2022-05-06

## 2022-05-06 NOTE — TELEPHONE ENCOUNTER
Called pt to let them know that Dr. Ben Orourke MD read echo test  results and they were as follows:    ----- Message from Ben Orourke MD sent at 4/27/2022  9:08 AM EDT -----  Please let the patient know that her echocardiogram is normal.    Kept getting busy signal, like phone was not working, tried with 1+ area code etc,  unable to leave voicemail.

## 2022-05-10 ENCOUNTER — OFFICE VISIT (OUTPATIENT)
Dept: FAMILY MEDICINE CLINIC | Age: 67
End: 2022-05-10
Payer: MEDICARE

## 2022-05-10 VITALS
WEIGHT: 289 LBS | HEART RATE: 69 BPM | TEMPERATURE: 97.9 F | SYSTOLIC BLOOD PRESSURE: 140 MMHG | BODY MASS INDEX: 39.14 KG/M2 | DIASTOLIC BLOOD PRESSURE: 62 MMHG | HEIGHT: 72 IN | RESPIRATION RATE: 20 BRPM

## 2022-05-10 DIAGNOSIS — N18.4 CKD (CHRONIC KIDNEY DISEASE) STAGE 4, GFR 15-29 ML/MIN (HCC): ICD-10-CM

## 2022-05-10 DIAGNOSIS — G62.9 PERIPHERAL POLYNEUROPATHY: ICD-10-CM

## 2022-05-10 DIAGNOSIS — E11.21 TYPE 2 DIABETES MELLITUS WITH DIABETIC NEPHROPATHY, WITHOUT LONG-TERM CURRENT USE OF INSULIN (HCC): ICD-10-CM

## 2022-05-10 DIAGNOSIS — M79.651 RIGHT THIGH PAIN: ICD-10-CM

## 2022-05-10 DIAGNOSIS — I10 PRIMARY HYPERTENSION: ICD-10-CM

## 2022-05-10 DIAGNOSIS — N18.5 CHRONIC KIDNEY DISEASE (CKD), STAGE V (HCC): ICD-10-CM

## 2022-05-10 DIAGNOSIS — M25.451 SWELLING OF JOINT OF PELVIC REGION OR THIGH, RIGHT: ICD-10-CM

## 2022-05-10 PROCEDURE — G8399 PT W/DXA RESULTS DOCUMENT: HCPCS | Performed by: FAMILY MEDICINE

## 2022-05-10 PROCEDURE — G8427 DOCREV CUR MEDS BY ELIG CLIN: HCPCS | Performed by: FAMILY MEDICINE

## 2022-05-10 PROCEDURE — G9231 DOC ESRD DIA TRANS PREG: HCPCS | Performed by: FAMILY MEDICINE

## 2022-05-10 PROCEDURE — 99213 OFFICE O/P EST LOW 20 MIN: CPT | Performed by: FAMILY MEDICINE

## 2022-05-10 PROCEDURE — G8417 CALC BMI ABV UP PARAM F/U: HCPCS | Performed by: FAMILY MEDICINE

## 2022-05-10 PROCEDURE — G9899 SCRN MAM PERF RSLTS DOC: HCPCS | Performed by: FAMILY MEDICINE

## 2022-05-10 PROCEDURE — 1101F PT FALLS ASSESS-DOCD LE1/YR: CPT | Performed by: FAMILY MEDICINE

## 2022-05-10 PROCEDURE — 2022F DILAT RTA XM EVC RTNOPTHY: CPT | Performed by: FAMILY MEDICINE

## 2022-05-10 PROCEDURE — 3017F COLORECTAL CA SCREEN DOC REV: CPT | Performed by: FAMILY MEDICINE

## 2022-05-10 PROCEDURE — G8510 SCR DEP NEG, NO PLAN REQD: HCPCS | Performed by: FAMILY MEDICINE

## 2022-05-10 PROCEDURE — 1090F PRES/ABSN URINE INCON ASSESS: CPT | Performed by: FAMILY MEDICINE

## 2022-05-10 PROCEDURE — G8536 NO DOC ELDER MAL SCRN: HCPCS | Performed by: FAMILY MEDICINE

## 2022-05-10 PROCEDURE — 3044F HG A1C LEVEL LT 7.0%: CPT | Performed by: FAMILY MEDICINE

## 2022-05-10 RX ORDER — CINACALCET 60 MG/1
60 TABLET, FILM COATED ORAL DAILY
COMMUNITY
Start: 2022-04-28

## 2022-05-10 RX ORDER — SODIUM ZIRCONIUM CYCLOSILICATE 10 G/10G
POWDER, FOR SUSPENSION ORAL
COMMUNITY
Start: 2022-04-27

## 2022-05-10 RX ORDER — INSULIN LISPRO 100 [IU]/ML
INJECTION, SOLUTION INTRAVENOUS; SUBCUTANEOUS
Qty: 40 ML | Refills: 5
Start: 2022-05-10

## 2022-05-10 RX ORDER — INSULIN GLARGINE 100 [IU]/ML
30 INJECTION, SOLUTION SUBCUTANEOUS DAILY
COMMUNITY

## 2022-05-10 RX ORDER — PREGABALIN 25 MG/1
25 CAPSULE ORAL 2 TIMES DAILY
Qty: 180 CAPSULE | Refills: 0 | Status: SHIPPED | OUTPATIENT
Start: 2022-05-10 | End: 2022-08-08

## 2022-05-10 NOTE — PATIENT INSTRUCTIONS
Diabetic Neuropathy: Care Instructions  Overview     When you have diabetes, your blood sugar level may get too high. Over time, high blood sugar levels can damage nerves. This is called diabetic neuropathy. Nerve damage can cause pain, burning, tingling, and numbness and may leave you feeling weak. The feet are often affected. When you have nerve damage in your feet, you cannot feel your feet and toes as well as normal and may not notice cuts or sores. Even a small injury can lead to a serious infection. It is very important that you follow your doctor's advice on foot care. Sometimes diabetes damages nerves that help the body function. If this happens, your blood pressure, sweating, digestion, and urination might be affected. Your doctor may give you a target range for your blood sugar that is higher or lower than you are used to. Try to keep your blood sugar very close to this target range to prevent more damage. Follow-up care is a key part of your treatment and safety. Be sure to make and go to all appointments, and call your doctor if you are having problems. It's also a good idea to know your test results and keep a list of the medicines you take. How can you care for yourself at home? · Take your medicines exactly as prescribed. Call your doctor if you think you are having a problem with your medicine. · Try to keep blood sugar in your target range. ? Follow your meal plan to know how much carbohydrate you need for meals and snacks. A registered dietitian or diabetes educator can help you plan meals. ? Try to get at least 30 minutes of exercise on most days. ? Check your blood sugar as many times each day as your doctor recommends. · Take and record your blood pressure at home if your doctor tells you to. To take your blood pressure at home:  ? Ask your doctor to check your blood pressure monitor to be sure it is accurate and the cuff fits you.  Also ask your doctor to watch you to make sure that you are using it right. ? Do not use medicine known to raise blood pressure (such as some nasal decongestant sprays) before taking your blood pressure. ? Avoid taking your blood pressure if you have just exercised or are nervous or upset. Rest at least 15 minutes before you take a reading. · Do not smoke. Smoking can increase your chance for a heart attack or stroke. If you need help quitting, talk to your doctor about stop-smoking programs and medicines. These can increase your chances of quitting for good. · Limit alcohol to 2 drinks a day for men and 1 drink a day for women. Too much alcohol can cause health problems. · Eat small meals often, rather than 2 or 3 large meals a day. To care for your feet  · Prevent injury by wearing shoes at all times, even when you are indoors. · Do foot care as part of your daily routine. Wash your feet and then rub lotion on your feet, but not between your toes. Use a handheld mirror or magnifying mirror to inspect your feet for blisters, cuts, cracks, or sores. · Have your toenails trimmed and filed straight across. · Wear shoes and socks that fit well. Soft shoes that have good support and that fit well (such as tennis shoes) are best for your feet. · Check your shoes for any loose objects or rough edges before you put them on. · Ask your doctor to check your feet during each visit. Your doctor may notice a foot problem you have missed. · Get early treatment for any foot problem, even a minor one. When should you call for help? Call your doctor now or seek immediate medical care if:    · You have symptoms of infection, such as:  ? Increased pain, swelling, warmth, or redness. ? Red streaks leading from the area. ? Pus draining from the area. ? A fever.     · You have new or worse numbness, pain, or tingling in any part of your body.    Watch closely for changes in your health, and be sure to contact your doctor if:    · You have a new problem with your feet, such as:  ? A new sore or ulcer. ? A break in the skin that is not healing after several days. ? Bleeding corns or calluses. ? An ingrown toenail.     · You do not get better as expected. Where can you learn more? Go to http://www.gray.com/  Enter V828 in the search box to learn more about \"Diabetic Neuropathy: Care Instructions. \"  Current as of: July 28, 2021               Content Version: 13.2  © 2006-2022 [employer]. Care instructions adapted under license by [employer] (which disclaims liability or warranty for this information). If you have questions about a medical condition or this instruction, always ask your healthcare professional. [address] any warranty or liability for your use of this information.

## 2022-05-10 NOTE — PROGRESS NOTES
1. \"Have you been to the ER, urgent care clinic since your last visit? Hospitalized since your last visit? \" No    2. \"Have you seen or consulted any other health care providers outside of the [address] [place] [address] since your last visit? \" No     3. For patients aged 39-70: Has the patient had a colonoscopy / FIT/ Cologuard? No      If the patient is female:    4. For patients aged 41-77: Has the patient had a mammogram within the past 2 years? Yes - no Care Gap present      5. For patients aged 21-65: Has the patient had a pap smear?  Yes - no Care Gap present

## 2022-05-11 NOTE — PROGRESS NOTES
HPI  This is a 44-year-old -American female with past medical history significant for diabetes currently on insulin pump, hypertension, anemia secondary to CKD, end-stage renal disease not currently on dialysis who is here for acute complaint    Left thigh pain and occasional swelling  Patient states that she has had swelling on and off associated with pain in the outer aspect of left thigh. States pain feels like a burning and sometimes hinders her from walking. I will rule out DVT with doppler US as patient has refused to go to ED. She states that she does not want to pay that kind of money and will not go. This has been going on for close to a year and no one has taken her seriously so why should she go to ER now. Another possibility is that patient may be having diabetic neuropathy so I will start patient on medication for nerve pain like lyrica but due to her decreased kidney function I will not do more than 25 mg BID for patient today. Patient states that she is leaving to [address] [place] [address] soon so she does not want to do anything at this time. She will come back in 6 months and figure things out. Insulin dependant type 2 DM  Patient is currently on insulin pump and is having frequent hypoglycemia episodes possible due to CKD making it so insulin stays in body longer. Advised patient to decrease insulin by 2 units every 3 days until hypoglycemia episodes have stopped. Patient will follow up with endocrinologist soon. She needs refill on her humalog and she will be prescribed glucose tablets    Primary hypertension  Patient's blood pressure is within normal limits. She denies any headaches, blurred vision, chest pain, shortness of breath, palpitation, swelling in legs, claudication. She states that blood pressures well controlled at home. She would like a refill on her amlodipine 10 mg daily, methyldopa 250 mg 3 times daily, Coreg 6.25 mg twice daily.         Past Medical History  Past Medical History:   Diagnosis Date    Asthma     [facility]     Cancer ([facility]     Diabetes ([facility]     Elevated serum creatinine     ESRD (end stage renal disease) ([facility]     Hypertension     Kidney failure     Serum potassium elevated        Surgical History  Past Surgical History:   Procedure Laterality Date    HX BILATERAL MASTECTOMY      HX BREAST BIOPSY      HX BREAST RECONSTRUCTION      HX HYSTERECTOMY      HX TONSILLECTOMY          Medications  Current Outpatient Medications   Medication Sig Dispense Refill    insulin glargine (Lantus Solostar U-100 Insulin) 100 unit/mL (3 mL) inpn 30 Units by SubCUTAneous route daily.  pregabalin (LYRICA) 25 mg capsule Take 1 Capsule by mouth two (2) times a day for 90 days. Max Daily Amount: 50 mg. 180 Capsule 0    HumaLOG U-100 Insulin 100 unit/mL injection FOR USE IN INSULIN PUMP 40 mL 5    folic acid (FOLVITE) 1 mg tablet Take 5 Tablets by mouth daily for 60 days. 300 Tablet 3    torsemide (DEMADEX) 20 mg tablet TAKE 2 TABLETS BY MOUTH EVERY DAY UNTIL SWELLING GOES DOWN THEN 1/2 TABLET BY MOUTH DAILY      simvastatin (ZOCOR) 40 mg tablet Take 1 Tablet by mouth nightly for 360 days. 90 Tablet 3    omeprazole (PRILOSEC) 40 mg capsule Take 1 Capsule by mouth daily for 360 days. 90 Capsule 3    carvediloL (COREG) 6.25 mg tablet Take 1 Tablet by mouth two (2) times daily (with meals) for 360 days. She Cuts Tablet into 4 and Takes a quarter 180 Tablet 3    sevelamer carbonate (RENVELA) 800 mg tab tab Take 1 Tablet by mouth three (3) times daily for 60 days. 90 Tablet 4    calcitRIOL (ROCALTROL) 0.25 mcg capsule Take 1 Capsule by mouth daily for 270 days. 90 Capsule 3    hydrALAZINE (APRESOLINE) 50 mg tablet Take 1 Tablet by mouth three (3) times daily for 90 days. 270 Tablet 3    NIFEdipine ER (ADALAT CC) 60 mg ER tablet Take 1 Tablet by mouth daily for 90 days.  90 Tablet 3    fluocinolone (Derma-Smoothe/FS Body Oil) 0.01 % external oil Apply to affected areas twice a day for 14 days 118.28 mL 1    clotrimazole (LOTRIMIN) 1 % topical cream Apply  to affected area two (2) times a day. 85 g 0    glucose (Dex4 Glucose) 4 gram chewable tablet Take 4 Tablets by mouth as needed (hypoglycemia) for up to 90 days. 90 Tablet 2    rashad. stocking,knee,reg,xlrg (Comp Stocking, Knee,Reg, X-Lrg) misc 2 pairs of stockings 4 Each 0    glucose blood VI test strips (ASCENSIA AUTODISC VI, ONE TOUCH ULTRA TEST VI) strip Check blood glucose 3 times a day 100 Strip 3    insulin pump (PATIENT SUPPLIED) misc by SubCUTAneous route continuous.  aspirin delayed-release 81 mg tablet Take  by mouth daily.  cinacalcet 60 mg tab Take 60 mg by mouth daily.  Lokelma 10 gram powder packet TAKE 1 BY MOUTH ONCE DAILY      Ferrous Fumarate 325 mg (106 mg iron) tab Take 325 mg by mouth daily for 90 days. (Patient not taking: Reported on 5/10/2022) 90 Tablet 3       Allergies  Allergies   Allergen Reactions    Amoclan [Amoxicillin-Pot Clavulanate] Swelling    Amitriptyline Itching    Benadryl [Diphenhydramine Hcl] Itching    Kiwi Itching    Pineapple Itching and Angioedema       Family History  History reviewed. No pertinent family history.     Social History  Social History     Socioeconomic History    Marital status:      Spouse name: Not on file    Number of children: Not on file    Years of education: Not on file    Highest education level: Not on file   Occupational History    Not on file   Tobacco Use    Smoking status: Never Smoker    Smokeless tobacco: Never Used   Vaping Use    Vaping Use: Never used   Substance and Sexual Activity    Alcohol use: Never    Drug use: Never    Sexual activity: Yes     Partners: Male     Birth control/protection: None   Other Topics Concern    Not on file   Social History Narrative    Not on file     Social Determinants of Health     Financial Resource Strain:     Difficulty of Paying Living Expenses: Not on file   Food Insecurity:     Worried About Running Out of Food in the Last Year: Not on file    Carol of Food in the Last Year: Not on file   Transportation Needs:     Lack of Transportation (Medical): Not on file    Lack of Transportation (Non-Medical): Not on file   Physical Activity:     Days of Exercise per Week: Not on file    Minutes of Exercise per Session: Not on file   Stress:     Feeling of Stress : Not on file   Social Connections:     Frequency of Communication with Friends and Family: Not on file    Frequency of Social Gatherings with Friends and Family: Not on file    Attends L[employer] day Services: Not on file    Active Member of [address] [place] [address] [employer] or Organizations: Not on file    Attends Club or Organization Meetings: Not on file    Marital Status: Not on file   Intimate Partner Violence:     Fear of Current or Ex-Partner: Not on file    Emotionally Abused: Not on file    Physically Abused: Not on file    Sexually Abused: Not on file   Housing Stability:     Unable to Pay for Housing in the Last Year: Not on file    Number of [place] in the Last Year: Not on file    Unstable Housing in the Last Year: Not on file       Review of Systems  Review of Systems   Constitutional: Negative for chills and fever. Respiratory: Negative for cough and shortness of breath. Cardiovascular: Negative for chest pain and leg swelling. Gastrointestinal: Negative for abdominal pain, nausea and vomiting. Skin: Negative for rash. Neurological: Negative for dizziness and headaches. Vital Signs  Visit Vitals  BP (!) 140/62 (BP 1 Location: Left upper arm, BP Patient Position: Sitting, BP Cuff Size: Adult long)   Pulse 69   Temp 97.9 °F (36.6 °C) (Temporal)   Resp 20   Ht 6' 1\" (1.854 m)   Wt 289 lb (131.1 kg)   LMP  (LMP Unknown)   BMI 38.13 kg/m²         Physical Exam  Physical Exam  Vitals reviewed. Constitutional:       Appearance: Normal appearance.    HENT:      Head: Normocephalic and atraumatic. Right Ear: External ear normal.      Left Ear: External ear normal.      Nose: Nose normal.      Mouth/Throat:      Mouth: Mucous membranes are moist.   Eyes:      Extraocular Movements: Extraocular movements intact. Conjunctiva/sclera: Conjunctivae normal.   Cardiovascular:      Rate and Rhythm: Normal rate and regular rhythm. Pulses: Normal pulses. Heart sounds: Normal heart sounds. No murmur heard. No gallop. Pulmonary:      Effort: Pulmonary effort is normal. No respiratory distress. Breath sounds: Normal breath sounds. No wheezing. Abdominal:      General: Bowel sounds are normal.      Palpations: Abdomen is soft. Musculoskeletal:         General: Tenderness present. Normal range of motion. Cervical back: Normal range of motion. Comments: Tender to palpation of the outer aspect of the left thigh. No noticeable swelling at this time that I can discern    Skin:     General: Skin is warm. Neurological:      General: No focal deficit present. Mental Status: She is alert and oriented to person, place, and time. Cranial Nerves: No cranial nerve deficit. Motor: No weakness.       Gait: Gait normal.   Psychiatric:         Mood and Affect: Mood normal.         Behavior: Behavior normal.                Results  Results for orders placed or performed during the hospital encounter of 04/22/22   CBC WITH AUTOMATED DIFF   Result Value Ref Range    WBC 6.4 4.6 - 13.2 K/uL    RBC 4.18 (L) 4.20 - 5.30 M/uL    HGB 10.2 (L) 12.0 - 16.0 g/dL    HCT 36.5 35.0 - 45.0 %    MCV 87.3 78.0 - 100.0 FL    MCH 24.4 24.0 - 34.0 PG    MCHC 27.9 (L) 31.0 - 37.0 g/dL    RDW 15.7 (H) 11.6 - 14.5 %    PLATELET 282 347 - 050 K/uL    MPV 11.9 (H) 9.2 - 11.8 FL    NRBC 0.0 0  WBC    ABSOLUTE NRBC 0.00 0.00 - 0.01 K/uL    NEUTROPHILS 58 40 - 73 %    LYMPHOCYTES 28 21 - 52 %    MONOCYTES 6 3 - 10 %    EOSINOPHILS 6 (H) 0 - 5 %    BASOPHILS 1 0 - 2 %    IMMATURE GRANULOCYTES 1 (H) 0.0 - 0.5 %    ABS. NEUTROPHILS 3.6 1.8 - 8.0 K/UL    ABS. LYMPHOCYTES 1.8 0.9 - 3.6 K/UL    ABS. MONOCYTES 0.4 0.05 - 1.2 K/UL    ABS. EOSINOPHILS 0.4 0.0 - 0.4 K/UL    ABS. BASOPHILS 0.1 0.0 - 0.1 K/UL    ABS. IMM. GRANS. 0.1 (H) 0.00 - 0.04 K/UL    DF AUTOMATED      PLATELET COMMENTS ADEQUATE PLATELETS      RBC COMMENTS ANISOCYTOSIS  1+        RBC COMMENTS HYPOCHROMIA  1+       RENAL FUNCTION PANEL   Result Value Ref Range    Sodium 143 136 - 145 mmol/L    Potassium 5.0 3.5 - 5.5 mmol/L    Chloride 112 (H) 100 - 111 mmol/L    CO2 26 21 - 32 mmol/L    Anion gap 5 3.0 - 18 mmol/L    Glucose 90 74 - 99 mg/dL    BUN 53 (H) 7.0 - 18 MG/DL    Creatinine 3.60 (H) 0.6 - 1.3 MG/DL    BUN/Creatinine ratio 15 12 - 20      GFR est AA 15 (L) >60 ml/min/1.73m2    GFR est non-AA 13 (L) >60 ml/min/1.73m2    Calcium 9.3 8.5 - 10.1 MG/DL    Phosphorus 4.7 2.5 - 4.9 MG/DL    Albumin 3.7 3.4 - 5.0 g/dL   PTH INTACT   Result Value Ref Range    Calcium 9.5 8.5 - 10.1 MG/DL    PTH, Intact 734.2 (H) 18.4 - 88.0 pg/mL   MICROALBUMIN, UR, RAND W/ MICROALB/CREAT RATIO   Result Value Ref Range    Microalbumin,urine random 108.00 (H) 0 - 3.0 MG/DL    Creatinine, urine 157.00 (H) 30 - 125 mg/dL    Microalbumin/Creat ratio (mg/g creat) 688 (H) 0 - 30 mg/g   PROTEIN URINE, RANDOM   Result Value Ref Range    Protein, urine random 150 (H) <11.9 mg/dL       ASSESSMENT and PLAN  1. Primary hypertension  -advised to keep log of her blood pressure at home   -Advised low salt diet  -advised to follow up with nephrologist as well   -refilled all of her medication at this time  - amLODIPine (NORVASC) 10 mg tablet; Take 1 Tablet by mouth daily for 180 days. Dispense: 30 Tablet; Refill: 5  - carvediloL (COREG) 6.25 mg tablet; Take 1 Tablet by mouth two (2) times daily (with meals) for 360 days. She Cuts Tablet into 4 and Takes a quarter  Dispense: 180 Tablet; Refill: 3    2.  Type 2 diabetes mellitus with diabetic nephropathy, without long-term current use of insulin ([facility]  -patient is on insulin pump and has been placed on Lantus 30 units at bedtime  -follow up with endocrinologist  - HumaLOG U-100 Insulin 100 unit/mL injection; FOR USE IN INSULIN PUMP  Dispense: 40 mL; Refill: 5    3. Chronic kidney disease (CKD), stage V/4. CKD (chronic kidney disease) stage 4, GFR 15-29 ml/min ([facility])  -follow up with nephrologist at this time  -monitor fluid intake and K+ intake and salt intake    5. Right thigh pain/6. Swelling of joint of pelvic region or thigh, right  - DUPLEX LOWER EXT VENOUS BILAT; Future    7. Peripheral polyneuropathy  -PDMP reviewed and consistent with medical drug administration record and patient history as well as prescriber  - pregabalin (LYRICA) 25 mg capsule; Take 1 Capsule by mouth two (2) times a day for 90 days. Max Daily Amount: 50 mg. Dispense: 180 Capsule; Refill: 0           Follow-up and Dispositions    · Return in about 6 weeks (around 6/21/2022), or if symptoms worsen or fail to improve, for est care with new PCP-list given to patient. I have discussed the diagnosis with the patient and the intended plan of care as seen in the above orders. The patient has received an after-visit summary and questions were answered concerning future plans. I have discussed medication, side effects, and warnings with the patient in detail. The patient verbalized understanding and is in agreement with the plan of care. The patient will contact the office with any additional concerns. I spent at least 30 minutes on this visit with this established patient. Carolyn Snider MD    PLEASE NOTE:   This document has been produced using voice recognition software.  Unrecognized errors in transcription may be present

## 2022-10-13 ENCOUNTER — PATIENT MESSAGE (OUTPATIENT)
Dept: CARDIOLOGY CLINIC | Age: 67
End: 2022-10-13

## 2024-12-13 NOTE — PROGRESS NOTES
Refilled methyldopa to 250 mg 3 times daily as well as Humalog insulin that she needs for her insulin pump. Patient also has possible tinea corporis infection for which I will prescribe clotrimazole cream to be used twice a day for the next 14 days.     Look with patient regarding her mammogram results being inconclusive on the left side with fibroglandular densities requiring further imaging which I will order at this time
For information on Fall & Injury Prevention, visit: [email or web address] OR  [email or web address] OR  https://www.cdc.gov/steadi/patient.html